# Patient Record
Sex: FEMALE | Race: WHITE | NOT HISPANIC OR LATINO | Employment: UNEMPLOYED | URBAN - METROPOLITAN AREA
[De-identification: names, ages, dates, MRNs, and addresses within clinical notes are randomized per-mention and may not be internally consistent; named-entity substitution may affect disease eponyms.]

---

## 2023-09-06 ENCOUNTER — HOSPITAL ENCOUNTER (INPATIENT)
Facility: MEDICAL CENTER | Age: 28
LOS: 2 days | End: 2023-09-08
Attending: OBSTETRICS & GYNECOLOGY | Admitting: OBSTETRICS & GYNECOLOGY
Payer: OTHER GOVERNMENT

## 2023-09-06 ENCOUNTER — HOSPITAL ENCOUNTER (EMERGENCY)
Facility: MEDICAL CENTER | Age: 28
End: 2023-09-06
Attending: OBSTETRICS & GYNECOLOGY | Admitting: OBSTETRICS & GYNECOLOGY
Payer: OTHER GOVERNMENT

## 2023-09-06 VITALS
TEMPERATURE: 97.2 F | SYSTOLIC BLOOD PRESSURE: 125 MMHG | DIASTOLIC BLOOD PRESSURE: 80 MMHG | HEIGHT: 68 IN | HEART RATE: 70 BPM | OXYGEN SATURATION: 92 % | BODY MASS INDEX: 30.16 KG/M2 | RESPIRATION RATE: 16 BRPM | WEIGHT: 199 LBS

## 2023-09-06 DIAGNOSIS — Z91.89 AT RISK FOR BREASTFEEDING DIFFICULTY: ICD-10-CM

## 2023-09-06 PROCEDURE — 99285 EMERGENCY DEPT VISIT HI MDM: CPT

## 2023-09-06 PROCEDURE — 59025 FETAL NON-STRESS TEST: CPT

## 2023-09-06 PROCEDURE — 770002 HCHG ROOM/CARE - OB PRIVATE (112)

## 2023-09-06 RX ORDER — DEXTROSE, SODIUM CHLORIDE, SODIUM LACTATE, POTASSIUM CHLORIDE, AND CALCIUM CHLORIDE 5; .6; .31; .03; .02 G/100ML; G/100ML; G/100ML; G/100ML; G/100ML
INJECTION, SOLUTION INTRAVENOUS CONTINUOUS
Status: DISCONTINUED | OUTPATIENT
Start: 2023-09-07 | End: 2023-09-08 | Stop reason: HOSPADM

## 2023-09-06 RX ORDER — OXYCODONE HYDROCHLORIDE 5 MG/1
5 TABLET ORAL
Status: DISCONTINUED | OUTPATIENT
Start: 2023-09-06 | End: 2023-09-07 | Stop reason: HOSPADM

## 2023-09-06 RX ORDER — IBUPROFEN 800 MG/1
800 TABLET ORAL
Status: DISCONTINUED | OUTPATIENT
Start: 2023-09-06 | End: 2023-09-07 | Stop reason: HOSPADM

## 2023-09-06 RX ORDER — ACETAMINOPHEN 500 MG
1000 TABLET ORAL
Status: DISCONTINUED | OUTPATIENT
Start: 2023-09-06 | End: 2023-09-07 | Stop reason: HOSPADM

## 2023-09-06 RX ORDER — ALUMINA, MAGNESIA, AND SIMETHICONE 2400; 2400; 240 MG/30ML; MG/30ML; MG/30ML
30 SUSPENSION ORAL EVERY 6 HOURS PRN
Status: DISCONTINUED | OUTPATIENT
Start: 2023-09-06 | End: 2023-09-07 | Stop reason: HOSPADM

## 2023-09-06 RX ORDER — SODIUM CHLORIDE, SODIUM LACTATE, POTASSIUM CHLORIDE, CALCIUM CHLORIDE 600; 310; 30; 20 MG/100ML; MG/100ML; MG/100ML; MG/100ML
1000 INJECTION, SOLUTION INTRAVENOUS CONTINUOUS
Status: ACTIVE | OUTPATIENT
Start: 2023-09-07 | End: 2023-09-07

## 2023-09-06 RX ORDER — ONDANSETRON 2 MG/ML
4 INJECTION INTRAMUSCULAR; INTRAVENOUS EVERY 6 HOURS PRN
Status: DISCONTINUED | OUTPATIENT
Start: 2023-09-06 | End: 2023-09-07 | Stop reason: HOSPADM

## 2023-09-06 RX ORDER — LIDOCAINE HYDROCHLORIDE 10 MG/ML
20 INJECTION, SOLUTION INFILTRATION; PERINEURAL
Status: DISCONTINUED | OUTPATIENT
Start: 2023-09-06 | End: 2023-09-07 | Stop reason: HOSPADM

## 2023-09-06 RX ORDER — METHYLERGONOVINE MALEATE 0.2 MG/ML
0.2 INJECTION INTRAVENOUS
Status: DISCONTINUED | OUTPATIENT
Start: 2023-09-06 | End: 2023-09-07 | Stop reason: HOSPADM

## 2023-09-06 RX ORDER — TERBUTALINE SULFATE 1 MG/ML
0.25 INJECTION, SOLUTION SUBCUTANEOUS
Status: DISCONTINUED | OUTPATIENT
Start: 2023-09-06 | End: 2023-09-07 | Stop reason: HOSPADM

## 2023-09-06 RX ORDER — MISOPROSTOL 200 UG/1
800 TABLET ORAL
Status: DISCONTINUED | OUTPATIENT
Start: 2023-09-06 | End: 2023-09-07 | Stop reason: HOSPADM

## 2023-09-06 RX ORDER — ONDANSETRON 4 MG/1
4 TABLET, ORALLY DISINTEGRATING ORAL EVERY 6 HOURS PRN
Status: DISCONTINUED | OUTPATIENT
Start: 2023-09-06 | End: 2023-09-07 | Stop reason: HOSPADM

## 2023-09-06 RX ORDER — CITRIC ACID/SODIUM CITRATE 334-500MG
30 SOLUTION, ORAL ORAL EVERY 6 HOURS PRN
Status: DISCONTINUED | OUTPATIENT
Start: 2023-09-06 | End: 2023-09-07 | Stop reason: HOSPADM

## 2023-09-06 RX ORDER — OXYTOCIN 10 [USP'U]/ML
10 INJECTION, SOLUTION INTRAMUSCULAR; INTRAVENOUS
Status: DISCONTINUED | OUTPATIENT
Start: 2023-09-06 | End: 2023-09-07 | Stop reason: HOSPADM

## 2023-09-06 NOTE — PROGRESS NOTES
1531: Patient is a  at 38 weeks and 5 days who arrived to L&D with c/o UC that began around 1000 this morning. Patient stated they are about 4-6 minutes apart. Patient reports +FM, denies LOF and VB. Patient denies complications with pregnancy. External monitors applied, VS obtained. SVE, see flow sheet documentation.     1556: RN telephoned Dr. Leblanc and provided update regarding SVE, patient VS, and tracing. Orders received to recheck patient in one hour and call MD back to update. If patient unchanged at that time, patient can be discharged per MD.     1703: Dr. Leblanc updated via telephone on patient SVE. Orders received to discharge patient. This RN requested Dr. Fierro to come to bedside to assess patient prior to discharge. Dr. Leblanc unable to come to bedside at this time, Dr. Tate to come to bedside to assess patient.    1707: Dr. Leblanc telephoned this RN and requested we monitor patient for an additional hour and obtain serial BP. If another BP is elevated, order PIH labs per Dr. Leblanc.     1824: RN reached out to Dr. Tate via telephone to request MD to bedside to evaluate patient prior to patient discharge. Dr. Tate to come to bedside to evaluate patient.     1845: Dr. Tate at bedside to assess patient.     1853: SVE by Dr. Tate. See flow sheet documentation. Orders received to discharge patient.     1901: Discharge instructions given to patient. Patient verbalized understanding and denies questions. Patient ambulated off unit in stable condition.

## 2023-09-07 ENCOUNTER — ANESTHESIA (OUTPATIENT)
Dept: ANESTHESIOLOGY | Facility: MEDICAL CENTER | Age: 28
End: 2023-09-07
Payer: OTHER GOVERNMENT

## 2023-09-07 ENCOUNTER — ANESTHESIA EVENT (OUTPATIENT)
Dept: ANESTHESIOLOGY | Facility: MEDICAL CENTER | Age: 28
End: 2023-09-07
Payer: OTHER GOVERNMENT

## 2023-09-07 LAB
BASOPHILS # BLD AUTO: 0.2 % (ref 0–1.8)
BASOPHILS # BLD: 0.04 K/UL (ref 0–0.12)
EOSINOPHIL # BLD AUTO: 0.12 K/UL (ref 0–0.51)
EOSINOPHIL NFR BLD: 0.6 % (ref 0–6.9)
ERYTHROCYTE [DISTWIDTH] IN BLOOD BY AUTOMATED COUNT: 42.3 FL (ref 35.9–50)
HCT VFR BLD AUTO: 40 % (ref 37–47)
HGB BLD-MCNC: 14.6 G/DL (ref 12–16)
HOLDING TUBE BB 8507: NORMAL
IMM GRANULOCYTES # BLD AUTO: 0.18 K/UL (ref 0–0.11)
IMM GRANULOCYTES NFR BLD AUTO: 0.9 % (ref 0–0.9)
LYMPHOCYTES # BLD AUTO: 1.97 K/UL (ref 1–4.8)
LYMPHOCYTES NFR BLD: 9.8 % (ref 22–41)
MCH RBC QN AUTO: 31.5 PG (ref 27–33)
MCHC RBC AUTO-ENTMCNC: 36.5 G/DL (ref 32.2–35.5)
MCV RBC AUTO: 86.4 FL (ref 81.4–97.8)
MONOCYTES # BLD AUTO: 1.17 K/UL (ref 0–0.85)
MONOCYTES NFR BLD AUTO: 5.8 % (ref 0–13.4)
NEUTROPHILS # BLD AUTO: 16.6 K/UL (ref 1.82–7.42)
NEUTROPHILS NFR BLD: 82.7 % (ref 44–72)
NRBC # BLD AUTO: 0 K/UL
NRBC BLD-RTO: 0 /100 WBC (ref 0–0.2)
PLATELET # BLD AUTO: 229 K/UL (ref 164–446)
PMV BLD AUTO: 10 FL (ref 9–12.9)
RBC # BLD AUTO: 4.63 M/UL (ref 4.2–5.4)
T PALLIDUM AB SER QL IA: NORMAL
WBC # BLD AUTO: 20.1 K/UL (ref 4.8–10.8)

## 2023-09-07 PROCEDURE — 700101 HCHG RX REV CODE 250: Performed by: ANESTHESIOLOGY

## 2023-09-07 PROCEDURE — 0UQMXZZ REPAIR VULVA, EXTERNAL APPROACH: ICD-10-PCS | Performed by: OBSTETRICS & GYNECOLOGY

## 2023-09-07 PROCEDURE — 700101 HCHG RX REV CODE 250: Performed by: OBSTETRICS & GYNECOLOGY

## 2023-09-07 PROCEDURE — 304965 HCHG RECOVERY SERVICES

## 2023-09-07 PROCEDURE — 700102 HCHG RX REV CODE 250 W/ 637 OVERRIDE(OP): Performed by: OBSTETRICS & GYNECOLOGY

## 2023-09-07 PROCEDURE — 59409 OBSTETRICAL CARE: CPT

## 2023-09-07 PROCEDURE — 700111 HCHG RX REV CODE 636 W/ 250 OVERRIDE (IP): Mod: JZ | Performed by: ANESTHESIOLOGY

## 2023-09-07 PROCEDURE — 700111 HCHG RX REV CODE 636 W/ 250 OVERRIDE (IP): Mod: JZ

## 2023-09-07 PROCEDURE — 86780 TREPONEMA PALLIDUM: CPT

## 2023-09-07 PROCEDURE — 10907ZC DRAINAGE OF AMNIOTIC FLUID, THERAPEUTIC FROM PRODUCTS OF CONCEPTION, VIA NATURAL OR ARTIFICIAL OPENING: ICD-10-PCS | Performed by: OBSTETRICS & GYNECOLOGY

## 2023-09-07 PROCEDURE — 36415 COLL VENOUS BLD VENIPUNCTURE: CPT

## 2023-09-07 PROCEDURE — 700105 HCHG RX REV CODE 258: Performed by: OBSTETRICS & GYNECOLOGY

## 2023-09-07 PROCEDURE — 85025 COMPLETE CBC W/AUTO DIFF WBC: CPT

## 2023-09-07 PROCEDURE — 0HQ9XZZ REPAIR PERINEUM SKIN, EXTERNAL APPROACH: ICD-10-PCS | Performed by: OBSTETRICS & GYNECOLOGY

## 2023-09-07 PROCEDURE — A9270 NON-COVERED ITEM OR SERVICE: HCPCS | Performed by: OBSTETRICS & GYNECOLOGY

## 2023-09-07 PROCEDURE — 303615 HCHG EPIDURAL/SPINAL ANESTHESIA FOR LABOR

## 2023-09-07 PROCEDURE — 700105 HCHG RX REV CODE 258: Performed by: ANESTHESIOLOGY

## 2023-09-07 PROCEDURE — 770002 HCHG ROOM/CARE - OB PRIVATE (112)

## 2023-09-07 PROCEDURE — 700111 HCHG RX REV CODE 636 W/ 250 OVERRIDE (IP): Mod: JZ | Performed by: OBSTETRICS & GYNECOLOGY

## 2023-09-07 RX ORDER — SODIUM CHLORIDE, SODIUM LACTATE, POTASSIUM CHLORIDE, CALCIUM CHLORIDE 600; 310; 30; 20 MG/100ML; MG/100ML; MG/100ML; MG/100ML
INJECTION, SOLUTION INTRAVENOUS PRN
Status: DISCONTINUED | OUTPATIENT
Start: 2023-09-07 | End: 2023-09-08 | Stop reason: HOSPADM

## 2023-09-07 RX ORDER — ROPIVACAINE HYDROCHLORIDE 2 MG/ML
INJECTION, SOLUTION EPIDURAL; INFILTRATION; PERINEURAL
Status: COMPLETED
Start: 2023-09-07 | End: 2023-09-07

## 2023-09-07 RX ORDER — SODIUM CHLORIDE, SODIUM LACTATE, POTASSIUM CHLORIDE, AND CALCIUM CHLORIDE .6; .31; .03; .02 G/100ML; G/100ML; G/100ML; G/100ML
250 INJECTION, SOLUTION INTRAVENOUS PRN
Status: DISCONTINUED | OUTPATIENT
Start: 2023-09-07 | End: 2023-09-07 | Stop reason: HOSPADM

## 2023-09-07 RX ORDER — BUPIVACAINE HYDROCHLORIDE 2.5 MG/ML
INJECTION, SOLUTION EPIDURAL; INFILTRATION; INTRACAUDAL
Status: COMPLETED
Start: 2023-09-07 | End: 2023-09-07

## 2023-09-07 RX ORDER — LEVOTHYROXINE SODIUM 0.05 MG/1
50 TABLET ORAL
Status: DISCONTINUED | OUTPATIENT
Start: 2023-09-07 | End: 2023-09-07 | Stop reason: HOSPADM

## 2023-09-07 RX ORDER — SIMETHICONE 125 MG
125 TABLET,CHEWABLE ORAL 4 TIMES DAILY PRN
Status: DISCONTINUED | OUTPATIENT
Start: 2023-09-07 | End: 2023-09-08 | Stop reason: HOSPADM

## 2023-09-07 RX ORDER — EPHEDRINE SULFATE 50 MG/ML
5 INJECTION, SOLUTION INTRAVENOUS
Status: DISCONTINUED | OUTPATIENT
Start: 2023-09-07 | End: 2023-09-07 | Stop reason: HOSPADM

## 2023-09-07 RX ORDER — ACETAMINOPHEN 500 MG
1000 TABLET ORAL EVERY 6 HOURS PRN
Status: DISCONTINUED | OUTPATIENT
Start: 2023-09-07 | End: 2023-09-08 | Stop reason: HOSPADM

## 2023-09-07 RX ORDER — BUPIVACAINE HYDROCHLORIDE 2.5 MG/ML
INJECTION, SOLUTION EPIDURAL; INFILTRATION; INTRACAUDAL PRN
Status: DISCONTINUED | OUTPATIENT
Start: 2023-09-07 | End: 2023-09-07 | Stop reason: SURG

## 2023-09-07 RX ORDER — ROPIVACAINE HYDROCHLORIDE 2 MG/ML
INJECTION, SOLUTION EPIDURAL; INFILTRATION; PERINEURAL CONTINUOUS
Status: DISCONTINUED | OUTPATIENT
Start: 2023-09-07 | End: 2023-09-08 | Stop reason: HOSPADM

## 2023-09-07 RX ORDER — IBUPROFEN 800 MG/1
800 TABLET ORAL EVERY 8 HOURS PRN
Status: DISCONTINUED | OUTPATIENT
Start: 2023-09-07 | End: 2023-09-08 | Stop reason: HOSPADM

## 2023-09-07 RX ORDER — BISACODYL 10 MG
10 SUPPOSITORY, RECTAL RECTAL PRN
Status: DISCONTINUED | OUTPATIENT
Start: 2023-09-07 | End: 2023-09-08 | Stop reason: HOSPADM

## 2023-09-07 RX ORDER — VITAMIN A ACETATE, BETA CAROTENE, ASCORBIC ACID, CHOLECALCIFEROL, .ALPHA.-TOCOPHEROL ACETATE, DL-, THIAMINE MONONITRATE, RIBOFLAVIN, NIACINAMIDE, PYRIDOXINE HYDROCHLORIDE, FOLIC ACID, CYANOCOBALAMIN, CALCIUM CARBONATE, FERROUS FUMARATE, ZINC OXIDE, CUPRIC OXIDE 3080; 12; 120; 400; 1; 1.84; 3; 20; 22; 920; 25; 200; 27; 10; 2 [IU]/1; UG/1; MG/1; [IU]/1; MG/1; MG/1; MG/1; MG/1; MG/1; [IU]/1; MG/1; MG/1; MG/1; MG/1; MG/1
1 TABLET, FILM COATED ORAL
Status: DISCONTINUED | OUTPATIENT
Start: 2023-09-07 | End: 2023-09-08 | Stop reason: HOSPADM

## 2023-09-07 RX ORDER — METHYLERGONOVINE MALEATE 0.2 MG/ML
0.2 INJECTION INTRAVENOUS
Status: DISCONTINUED | OUTPATIENT
Start: 2023-09-07 | End: 2023-09-08 | Stop reason: HOSPADM

## 2023-09-07 RX ORDER — LEVOTHYROXINE SODIUM 0.05 MG/1
50 TABLET ORAL
COMMUNITY

## 2023-09-07 RX ORDER — LIDOCAINE HYDROCHLORIDE AND EPINEPHRINE 15; 5 MG/ML; UG/ML
INJECTION, SOLUTION EPIDURAL PRN
Status: DISCONTINUED | OUTPATIENT
Start: 2023-09-07 | End: 2023-09-07 | Stop reason: SURG

## 2023-09-07 RX ORDER — SODIUM CHLORIDE, SODIUM LACTATE, POTASSIUM CHLORIDE, AND CALCIUM CHLORIDE .6; .31; .03; .02 G/100ML; G/100ML; G/100ML; G/100ML
1000 INJECTION, SOLUTION INTRAVENOUS
Status: COMPLETED | OUTPATIENT
Start: 2023-09-07 | End: 2023-09-07

## 2023-09-07 RX ORDER — DOCUSATE SODIUM 100 MG/1
100 CAPSULE, LIQUID FILLED ORAL 2 TIMES DAILY PRN
Status: DISCONTINUED | OUTPATIENT
Start: 2023-09-07 | End: 2023-09-08 | Stop reason: HOSPADM

## 2023-09-07 RX ORDER — CARBOPROST TROMETHAMINE 250 UG/ML
250 INJECTION, SOLUTION INTRAMUSCULAR
Status: DISCONTINUED | OUTPATIENT
Start: 2023-09-07 | End: 2023-09-08 | Stop reason: HOSPADM

## 2023-09-07 RX ORDER — CALCIUM CARBONATE 500 MG/1
1000 TABLET, CHEWABLE ORAL EVERY 6 HOURS PRN
Status: DISCONTINUED | OUTPATIENT
Start: 2023-09-07 | End: 2023-09-08 | Stop reason: HOSPADM

## 2023-09-07 RX ORDER — MISOPROSTOL 200 UG/1
600 TABLET ORAL
Status: DISCONTINUED | OUTPATIENT
Start: 2023-09-07 | End: 2023-09-08 | Stop reason: HOSPADM

## 2023-09-07 RX ORDER — OXYCODONE HYDROCHLORIDE 5 MG/1
5 TABLET ORAL EVERY 4 HOURS PRN
Status: DISCONTINUED | OUTPATIENT
Start: 2023-09-07 | End: 2023-09-08 | Stop reason: HOSPADM

## 2023-09-07 RX ADMIN — FENTANYL CITRATE 100 MCG: 50 INJECTION, SOLUTION INTRAMUSCULAR; INTRAVENOUS at 01:49

## 2023-09-07 RX ADMIN — SODIUM CHLORIDE, POTASSIUM CHLORIDE, SODIUM LACTATE AND CALCIUM CHLORIDE 1000 ML: 600; 310; 30; 20 INJECTION, SOLUTION INTRAVENOUS at 01:00

## 2023-09-07 RX ADMIN — SODIUM CHLORIDE 2.5 MILLION UNITS: 9 INJECTION, SOLUTION INTRAVENOUS at 06:17

## 2023-09-07 RX ADMIN — OXYTOCIN 2 MILLI-UNITS/MIN: 10 INJECTION, SOLUTION INTRAMUSCULAR; INTRAVENOUS at 07:35

## 2023-09-07 RX ADMIN — SODIUM CHLORIDE 2.5 MILLION UNITS: 9 INJECTION, SOLUTION INTRAVENOUS at 09:57

## 2023-09-07 RX ADMIN — LIDOCAINE HYDROCHLORIDE,EPINEPHRINE BITARTRATE 3 ML: 15; .005 INJECTION, SOLUTION EPIDURAL; INFILTRATION; INTRACAUDAL; PERINEURAL at 01:39

## 2023-09-07 RX ADMIN — OXYTOCIN 20 UNITS: 10 INJECTION, SOLUTION INTRAMUSCULAR; INTRAVENOUS at 11:48

## 2023-09-07 RX ADMIN — ROPIVACAINE HYDROCHLORIDE 200 MG: 2 INJECTION, SOLUTION EPIDURAL; INFILTRATION at 01:43

## 2023-09-07 RX ADMIN — SODIUM CHLORIDE 5 MILLION UNITS: 900 INJECTION INTRAVENOUS at 00:49

## 2023-09-07 RX ADMIN — SODIUM CHLORIDE, POTASSIUM CHLORIDE, SODIUM LACTATE AND CALCIUM CHLORIDE 1000 ML: 600; 310; 30; 20 INJECTION, SOLUTION INTRAVENOUS at 00:45

## 2023-09-07 RX ADMIN — SODIUM CHLORIDE, SODIUM LACTATE, POTASSIUM CHLORIDE, CALCIUM CHLORIDE AND DEXTROSE MONOHYDRATE: 5; 600; 310; 30; 20 INJECTION, SOLUTION INTRAVENOUS at 08:19

## 2023-09-07 RX ADMIN — OXYTOCIN 125 ML/HR: 10 INJECTION, SOLUTION INTRAMUSCULAR; INTRAVENOUS at 13:12

## 2023-09-07 RX ADMIN — BUPIVACAINE HYDROCHLORIDE 5 ML: 2.5 INJECTION, SOLUTION EPIDURAL; INFILTRATION; INTRACAUDAL at 01:49

## 2023-09-07 RX ADMIN — ACETAMINOPHEN 1000 MG: 500 TABLET ORAL at 20:10

## 2023-09-07 RX ADMIN — ROPIVACAINE HYDROCHLORIDE: 2 INJECTION, SOLUTION EPIDURAL; INFILTRATION at 10:00

## 2023-09-07 RX ADMIN — LEVOTHYROXINE SODIUM 50 MCG: 0.05 TABLET ORAL at 06:13

## 2023-09-07 ASSESSMENT — PAIN SCALES - GENERAL: PAIN_LEVEL: 0

## 2023-09-07 ASSESSMENT — PATIENT HEALTH QUESTIONNAIRE - PHQ9
1. LITTLE INTEREST OR PLEASURE IN DOING THINGS: NOT AT ALL
2. FEELING DOWN, DEPRESSED, IRRITABLE, OR HOPELESS: NOT AT ALL
SUM OF ALL RESPONSES TO PHQ9 QUESTIONS 1 AND 2: 0

## 2023-09-07 ASSESSMENT — PAIN DESCRIPTION - PAIN TYPE: TYPE: ACUTE PAIN

## 2023-09-07 ASSESSMENT — LIFESTYLE VARIABLES: ALCOHOL_USE: NO

## 2023-09-07 NOTE — PROGRESS NOTES
Pt received to room 331. Report received from L&D RN. Pt oriented to room, call light, infant security, emergency light, visiting hours and unit routine. Plan of care discussed encouraged to call with needs.    No

## 2023-09-07 NOTE — ANESTHESIA TIME REPORT
Anesthesia Start and Stop Event Times     Date Time Event    9/7/2023 0127 Ready for Procedure     0127 Anesthesia Start     1147 Anesthesia Stop        Responsible Staff  09/07/23    Name Role Begin End    Salvador Womack M.D. Anesth 0127 0700    Azalea Bacon M.D. Anesth 0700 1147        Overtime Reason:  no overtime (within assigned shift)    Comments:

## 2023-09-07 NOTE — ED PROVIDER NOTES
DATE OF SERVICE:  2023     OBSTETRIC EMERGENCY DEPARTMENT CONSULTATION     CHIEF COMPLAINT:  Contractions.     HISTORY OF PRESENT ILLNESS:  This 28-year-old lady is  1.  Her   gestational age is 38 and 5/7 weeks.  She recently moved here from [place].    She came complaining of regular painful contractions.  She has been monitored   in the triage unit for several hours now.  Her cervix has been checked   multiple times, most recently by me about 5 minutes ago.  Her cervix is not   changing, it is 1 cm dilated, 70% effaced with the baby's head at -2 station.    Membranes are intact.  Fetal monitoring is reassuring.     PHYSICAL EXAMINATION:  VITAL SIGNS:  She is afebrile, temperature is 97.2, blood pressure 125/80.  ABDOMEN:  Her uterus is soft and nontender.     ASSESSMENT AND PLAN:  She is clearly not in active labor.  This could be early   labor, but admission is not warranted at this time.  She will be staying in   [facility] and keeping her appointment with Dr. Banerjee at 0900 tomorrow.    We gave her instructions on when to return if she has more convincing signs   of active labor.        ______________________________  MD LANDEN Roger/IVETTE    DD:  2023 19:01  DT:  2023 20:37    Job#:  [id number]    CC:FLIP BANERJEE MD

## 2023-09-07 NOTE — ANESTHESIA PREPROCEDURE EVALUATION
Date: 09/07/23  Procedure: Labor Epidural         Relevant Problems   No relevant active problems       Physical Exam    Airway   Mallampati: II  TM distance: >3 FB  Neck ROM: full       Cardiovascular - normal exam  Rhythm: regular  Rate: normal  (-) murmur     Dental - normal exam           Pulmonary - normal exam  Breath sounds clear to auscultation     Abdominal    Neurological - normal exam                 Anesthesia Plan    ASA 2       Plan - epidural   Neuraxial block will be labor analgesia                  Pertinent diagnostic labs and testing reviewed    Informed Consent:    Anesthetic plan and risks discussed with patient.

## 2023-09-07 NOTE — ANESTHESIA PROCEDURE NOTES
Epidural Block    Date/Time: 9/7/2023 1:35 AM    Performed by: Salvador Womack M.D.  Authorized by: Salvador Womack M.D.    Patient Location:  OB  Start Time:  9/7/2023 1:35 AM  End Time:  9/7/2023 1:39 AM  Reason for Block: labor analgesia    patient identified, IV checked, site marked, risks and benefits discussed, surgical consent, monitors and equipment checked and pre-op evaluation    Patient Position:  Sitting  Prep: ChloraPrep, patient draped and sterile technique    Monitoring:  Blood pressure, continuous pulse oximetry and heart rate  Approach:  Midline  Location:  L3-L4  Injection Technique:  SHEREEN saline  Skin infiltration:  Lidocaine  Strength:  1%  Dose:  3ml  Needle Type:  Tuohy  Needle Gauge:  17 G  Needle Length:  3.5 in  Loss of resistance::  4.5  Catheter Size:  19 G  Catheter at Skin Depth:  9  Test Dose Result:  Negative   No injection resistance with test dose, but after epidural was taped and secured with patient laying supine in bed, unable to inject additional bolus dose. Catheter and alligator clamp were checked. No obvious obstruction or kink was identified. Still unable to inject. Tape dressing was then carefully removed with the patient sitting upright. Epidural catheter was then easy to inject. Epidural re-taped and repeat attempt to bolus was easy.

## 2023-09-07 NOTE — PROGRESS NOTES
Labor Progress Note    Yamilex Horton   38w6d      Subjective:  Pt comfortable with epidural.  Uterine contractions:yes  Pain: No    Objective:   Vitals:    23 0733 23 0744 23 0800 23 0816   BP: 101/49 113/56 107/59 109/63   Pulse: 87 70 68 69   Resp:       Temp:       TempSrc:       SpO2:       Weight:       Height:         FHT: 140's cat 1  [place]: q 3-5  SVE:4-5/90/0, arom, light mec  Membranes ruptured: .yes, light mec    Meds:   Epidural : .yes  Pitocin: .yes, 4 mu  Pencillin G per GBBS protocol  Labs:  Recent Results (from the past 24 hour(s))   Hold Blood Bank Specimen (Not Tested)    Collection Time: 23 12:17 AM   Result Value Ref Range    Holding Tube - Bb DONE    CBC with differential    Collection Time: 23 12:17 AM   Result Value Ref Range    WBC 20.1 (H) 4.8 - 10.8 K/uL    RBC 4.63 4.20 - 5.40 M/uL    Hemoglobin 14.6 12.0 - 16.0 g/dL    Hematocrit 40.0 37.0 - 47.0 %    MCV 86.4 81.4 - 97.8 fL    MCH 31.5 27.0 - 33.0 pg    MCHC 36.5 (H) 32.2 - 35.5 g/dL    RDW 42.3 35.9 - 50.0 fL    Platelet Count 229 164 - 446 K/uL    MPV 10.0 9.0 - 12.9 fL    Neutrophils-Polys 82.70 (H) 44.00 - 72.00 %    Lymphocytes 9.80 (L) 22.00 - 41.00 %    Monocytes 5.80 0.00 - 13.40 %    Eosinophils 0.60 0.00 - 6.90 %    Basophils 0.20 0.00 - 1.80 %    Immature Granulocytes 0.90 0.00 - 0.90 %    Nucleated RBC 0.00 0.00 - 0.20 /100 WBC    Neutrophils (Absolute) 16.60 (H) 1.82 - 7.42 K/uL    Lymphs (Absolute) 1.97 1.00 - 4.80 K/uL    Monos (Absolute) 1.17 (H) 0.00 - 0.85 K/uL    Eos (Absolute) 0.12 0.00 - 0.51 K/uL    Baso (Absolute) 0.04 0.00 - 0.12 K/uL    Immature Granulocytes (abs) 0.18 (H) 0.00 - 0.11 K/uL    NRBC (Absolute) 0.00 K/uL   T.PALLIDUM AB BOBBY (Syphilis)    Collection Time: 23 12:17 AM   Result Value Ref Range    Syphilis, Treponemal Qual Non-Reactive Non-Reactive       Assessment:   38w6d/active labor-progressing. CPM. Expt .  GBBS pos-s/p 2nd dose of Penicillin G  Light  [facility]  Fetal status-reassuring        Felisa Leblanc M.D.

## 2023-09-07 NOTE — CARE PLAN
The patient is Stable - Low risk of patient condition declining or worsening    Shift Goals  Clinical Goals: make adequate cervical change  Patient Goals: healthy baby and mom  Family Goals: support    Progress made toward(s) clinical / shift goals:      Problem: Knowledge Deficit - L&D  Goal: Patient and family/caregivers will demonstrate understanding of plan of care, disease process/condition, diagnostic tests and medications  Outcome: Progressing   Patient and FOB remain updated on Plan of care.   Problem: Risk for Excess Fluid Volume  Goal: Patient will demonstrate pulse, blood pressure and neurologic signs within expected ranges and without any respiratory complications  Outcome: Progressing     Problem: Psychosocial - L&D  Goal: Patient's level of anxiety will decrease  Outcome: Progressing  Goal: Patient will be able to discuss coping skills during hospitalization  Outcome: Progressing  Goal: Patient's ability to re-evaluate and adapt role responsibilities will improve  Outcome: Progressing  Goal: Spiritual and cultural needs incorporated into hospitalization  Outcome: Progressing     Problem: Risk for Venous Thromboembolism (VTE)  Goal: VTE prevention measures will be implemented and patient will remain free from VTE  Outcome: Progressing     Problem: Risk for Infection and Impaired Wound Healing  Goal: Patient will remain free from infection  Outcome: Progressing  Goal: Patient's wound/surgical incision will decrease in size and heals properly  Outcome: Progressing     Problem: Risk for Fluid Imbalance  Goal: Patient's fluid volume balance will be maintained or improve  Outcome: Progressing     Problem: Risk for Injury  Goal: Patient and fetus will be free of preventable injury/complications  Outcome: Progressing     Problem: Pain  Goal: Patient's pain will be alleviated or reduced to the patient’s comfort goal  Outcome: Progressing   Patient remained comfortable with epidural during labor  Problem:  Discharge Barriers/Planning  Goal: Patient's continuum of care needs are met  Outcome: Progressing

## 2023-09-07 NOTE — PROGRESS NOTES
0100: Report received from Jaqueline LAGUERRE. Plan of care discussed, care assumed at this time. This RN prepping patient for epidural.     0126: Cirac MD at bedside for epidural placement, time out performed, all agree.     0530: SVE 3-4/80/-2. Bebeto HERNANDEZ notified of patient status. MD to come to bedside to discuss augmentation options with patient.     0620: Bebeto HERNANDEZ to bedside. New orders received, see MAR.

## 2023-09-07 NOTE — PROGRESS NOTES
EDC 9/15/2023 38w5d    Patient presents to L&D triage unit reporting regular uterine contractions that began at 10am today that have gotten stronger and are now 3-5 minutes apart. Patient denies LOF/VB; states normal FM. EFM monitors applied and tracing. VSS. Mother at bedside. Call light at bedside, patient instructed on use. Questions answered at this time.     2345: SVE 3/80/-2. Dr. Tate updated, orders received.     0000: Dr. Tate to bedside.     Patient ambulated to S 214.    0100: Report to Arlene LAGUERRE. Care relinquished.

## 2023-09-07 NOTE — CARE PLAN
Problem: Risk for Injury  Goal: Patient and fetus will be free of preventable injury/complications  Outcome: Progressing  Note: Continuous fetal heart rate and uterine contraction monitoring in place.      Problem: Pain  Goal: Patient's pain will be alleviated or reduced to the patient’s comfort goal  Outcome: Progressing  Note: Patient educated on epidural; patient requesting one at this time. This RN prepping patient for epidural placement.      The patient is Stable - Low risk of patient condition declining or worsening    Shift Goals  Clinical Goals: pain management; cervical dilation  Patient Goals: pain control  Family Goals: support    Progress made toward(s) clinical / shift goals:  Progressing

## 2023-09-07 NOTE — ANESTHESIA POSTPROCEDURE EVALUATION
Patient: Yamilex Horton    Procedure Summary     Date: 09/07/23 Room / Location:     Anesthesia Start: 0127 Anesthesia Stop: 1147    Procedure: Labor Epidural Diagnosis:     Scheduled Providers:  Responsible Provider: Azalea Bacon M.D.    Anesthesia Type: epidural ASA Status: 2          Final Anesthesia Type: epidural  Last vitals  BP   Blood Pressure: 114/67    Temp   36.2 °C (97.1 °F)    Pulse   77   Resp   (!) 22    SpO2   95 %      Anesthesia Post Evaluation    Patient location during evaluation: bedside  Patient participation: complete - patient participated  Level of consciousness: awake and alert  Pain score: 0    Airway patency: patent  Anesthetic complications: no  Cardiovascular status: hemodynamically stable  Respiratory status: acceptable  Hydration status: euvolemic    PONV: none          No notable events documented.              [facility] Daily Progress Note    Patient: Magdalene Shields Date: 2022   : 1957 Attending: Tyron Childers [facility] Cornell Farrar.

## 2023-09-07 NOTE — PROGRESS NOTES
(Awaiting H and P transcription from 5.5 hrs ago)    Epidural placed.  S/p 2nd dose IV PenG GBS proph.  FHR Cat I  Ctx have spaced out to Q 5 minutes.  Cx still 3/80%/-2 per RN (unchanged)    Disc options for augmentation (AROM vs pitocin).    PLAN: Pitocin augmentation.

## 2023-09-07 NOTE — H&P
DATE OF ADMISSION:  2023     CHIEF COMPLAINT:  Contractions.     HISTORY OF PRESENT ILLNESS:  The patient is a 28-year-old lady,  1, her   JHOANA is 09/15/2023, so her EGA is 38 and 6/7th weeks.  She is here with   regular painful contractions and cervical change.  She is reji every   2.5 minutes.  Her cervix is 3 cm dilated, 80% effaced with the baby's head at   -2 station.  Membranes were intact.  Her cervix has changed markedly from when   she was here 6 hours ago, sent home undelivered.  Fetal monitoring is   reassuring.  She is group B strep positive.  Preparations are being made for   Admission, epidural anesthesia and intravenous penicillin G for group B strep   prophylaxis.     Prenatal care started in [place] and transferred here 2 weeks ago.  Her blood   type is O positive.  Serologies were negative.  She reports that her group B   strep culture was positive in [place].  We do not have that report.  She said   she had a negative Cell-free DNA screening.  She has been on hypothyroidism   and reports normal TSH levels.  All of her blood pressures have been normal.     OBSTETRICAL HISTORY:  G1.     PAST MEDICAL HISTORY:  Positive for hypothyroidism.     PAST SURGICAL HISTORY: Left MCL reconstruction in .     MEDICATIONS:   1.  Prenatal vitamin daily.  2.  Levothyroxine 50 mcg daily.     FAMILY HISTORY:  Unremarkable.     PHYSICAL EXAMINATION:    VITAL SIGNS:  Temperature 98.0, pulse 75, respirations 22 and /71.  HEENT:  Normal.  LUNGS:  Clear to auscultation.  HEART:  Sounds normal.  ABDOMEN:  Nontender.  Fundal height is appropriate.  No hepatosplenomegaly.  EXTREMITIES:  1+ edema.  Homans' negative.  NEUROLOGIC:  DTRs normal.  PELVIC:  Per RN, cervix is 2 cm dilated, 50% effaced with the baby's head at   -2 station.     DIAGNOSES:   1.  A 38 and 6/7th weeks' gestation.  2.  Labor.  3.  Group B strep positive.     PLAN:  Admit, pursue delivery.  I have ordered intravenous  penicillin G for   group B strep prophylaxis.        ______________________________  MD LANDEN Roger/JOS    DD:  09/07/2023 00:20  DT:  09/07/2023 00:35    Job#:  [id number]    CC:FLIP BANERJEE MD

## 2023-09-07 NOTE — PROGRESS NOTES
0700: Report received from Arlene MARSH RN. POC discussed. FOB Gildardo at bedside. Patient denies concerns or needs at this time. Call light in reach.     1136: Dr. Leblanc, RT, and transition nurse at bedside with this RN to attend delivery. Patient in stirrups and began pushing.    1147: Spontaneous vaginal delivery of viable male infant. See provider delivery note for details.      1505: patient and infant transferred up to postpartum room S331 in stable condition via wheelchair by RN, accompanied by FOB with all belongings in hand. Report given to CARLOTTA Dwyer. POC discussed. Baby bands verified and correct. Care relinquished.

## 2023-09-07 NOTE — L&D DELIVERY NOTE
Delivery note  , male with loose nuchal cord x 1 and light mec. RT at bedside. Head delivered atraumatically, loose nuchal reduced and rest of infant delivered without problems. Mouth and nose bulb suctioned. Apgars 8 and 9. Infant placed on maternal abdomen and cord doubly clamped and cut. Placenta intact, 3 vc. 1st degree vaginal laceration repaired with 2-0 vicryl on ct-1 needle. Left labial abrasion repaired with same 2-0 vicryl on ct-1 needle. Uterus is firm. EBL: 300. Mom and baby doing well. Pt had 3 doses of Penicillin G.

## 2023-09-08 VITALS
DIASTOLIC BLOOD PRESSURE: 89 MMHG | OXYGEN SATURATION: 94 % | SYSTOLIC BLOOD PRESSURE: 116 MMHG | HEART RATE: 60 BPM | WEIGHT: 199 LBS | HEIGHT: 68 IN | RESPIRATION RATE: 16 BRPM | TEMPERATURE: 96.9 F | BODY MASS INDEX: 30.16 KG/M2

## 2023-09-08 LAB
ERYTHROCYTE [DISTWIDTH] IN BLOOD BY AUTOMATED COUNT: 45.7 FL (ref 35.9–50)
HCT VFR BLD AUTO: 36.5 % (ref 37–47)
HGB BLD-MCNC: 12.6 G/DL (ref 12–16)
MCH RBC QN AUTO: 31 PG (ref 27–33)
MCHC RBC AUTO-ENTMCNC: 34.5 G/DL (ref 32.2–35.5)
MCV RBC AUTO: 89.7 FL (ref 81.4–97.8)
PLATELET # BLD AUTO: 194 K/UL (ref 164–446)
PMV BLD AUTO: 10 FL (ref 9–12.9)
RBC # BLD AUTO: 4.07 M/UL (ref 4.2–5.4)
WBC # BLD AUTO: 17.2 K/UL (ref 4.8–10.8)

## 2023-09-08 PROCEDURE — 85027 COMPLETE CBC AUTOMATED: CPT

## 2023-09-08 PROCEDURE — 36415 COLL VENOUS BLD VENIPUNCTURE: CPT

## 2023-09-08 PROCEDURE — 700102 HCHG RX REV CODE 250 W/ 637 OVERRIDE(OP): Performed by: OBSTETRICS & GYNECOLOGY

## 2023-09-08 PROCEDURE — A9270 NON-COVERED ITEM OR SERVICE: HCPCS | Performed by: OBSTETRICS & GYNECOLOGY

## 2023-09-08 RX ADMIN — ACETAMINOPHEN 1000 MG: 500 TABLET ORAL at 01:42

## 2023-09-08 RX ADMIN — PRENATAL WITH FERROUS FUM AND FOLIC ACID 1 TABLET: 3080; 920; 120; 400; 22; 1.84; 3; 20; 10; 1; 12; 200; 27; 25; 2 TABLET ORAL at 10:04

## 2023-09-08 ASSESSMENT — EDINBURGH POSTNATAL DEPRESSION SCALE (EPDS)
I HAVE BLAMED MYSELF UNNECESSARILY WHEN THINGS WENT WRONG: YES, SOME OF THE TIME
I HAVE BEEN ABLE TO LAUGH AND SEE THE FUNNY SIDE OF THINGS: AS MUCH AS I ALWAYS COULD
I HAVE BEEN ANXIOUS OR WORRIED FOR NO GOOD REASON: YES, SOMETIMES
I HAVE FELT SAD OR MISERABLE: NO, NOT AT ALL
I HAVE LOOKED FORWARD WITH ENJOYMENT TO THINGS: AS MUCH AS I EVER DID
THINGS HAVE BEEN GETTING ON TOP OF ME: NO, MOST OF THE TIME I HAVE COPED QUITE WELL
I HAVE BEEN SO UNHAPPY THAT I HAVE BEEN CRYING: NO, NEVER
THE THOUGHT OF HARMING MYSELF HAS OCCURRED TO ME: NEVER
I HAVE BEEN SO UNHAPPY THAT I HAVE HAD DIFFICULTY SLEEPING: NOT AT ALL
I HAVE FELT SCARED OR PANICKY FOR NO GOOD REASON: NO, NOT MUCH

## 2023-09-08 ASSESSMENT — PAIN DESCRIPTION - PAIN TYPE
TYPE: ACUTE PAIN
TYPE: ACUTE PAIN

## 2023-09-08 NOTE — LACTATION NOTE
Initial Lactation Consultation:    Met with Yamilex and her new baby boy Med.  She reports breastfeeding has been somewhat challenging. Med will latch, but then becomes fussy and pulls off.    Infant presents with feeding cues at this time and is placed skin-to-skin with his mother. Hand expression demonstrated with easily expressible colostrum. Lactation consultant attempted to assist with latch onto left breast, using cross cradle hold. Infant fussy and unable to achieve latch. Repositioned into side-lying hold. Initially infant continued fussing at breast, but after several attempts, latch sustained. Infant visualized to have rhythmic suck pattern at breast. Swallows appreciated Mother of baby denies pain with latch. Infant able to sustain latch for several minutes prior to pulling off and re-latching.     feeding and voiding/stooling patterns reviewed. Frequent skin-to-skin and cue-based feeding is encouraged; at least 8 feeds per 24 hours. Reviewed the milk making process, inclusive of supply and demand. Discussed signs of deep, asymmetric latch, and the importance of maintaining good latch to avoid pain/nipple damage and maximize milk transfer. Yamilex is to offer both breasts at each feeding, and baby should be allowed to self-limit time at breast. Discouraged introduction of artificial nipples during first 4 weeks, unless medically indicated.    Feeding plan:     Continue with cue-based breast feeding at least once every three days, for a total of 8+ feedings per 24 hours.     Yamilex is provided with the opportunity to ask questions. These have been answered to her satisfaction. She is encouraged to call RN/lactation for additional breastfeeding assistance, as needed, throughout remainder of hospital stay.       Encouraged follow up with [facility] for outpatient lactation support (referral sent).     [facility] Breastfeeding Resource list provided.

## 2023-09-08 NOTE — PROGRESS NOTES
Assessment complete. Patient resting comfortably in bed at this time. Fundus firm, lochia light. Pain controlled with PRN meds per MAR. Patient states voiding without difficulty. Plan of care discussed. Call light within patient reach, patient to call if assistance needed.

## 2023-09-08 NOTE — CARE PLAN
The patient is Stable - Low risk of patient condition declining or worsening    Shift Goals  Clinical Goals: VSS, pain WDL  Patient Goals: healthy baby and mom  Family Goals: support    Progress made toward(s) clinical / shift goals:          Problem: Knowledge Deficit - Postpartum  Goal: Patient will verbalize and demonstrate understanding of self and infant care  Outcome: Progressing  Parents educated and all of their questions were answered.     Problem: Early Mobilization - Post Surgery  Goal: Early mobilization post surgery  Outcome: Progressing  Patient was able to mobilize without any issues. She denies pain or dizziness when mobilizing.

## 2023-09-08 NOTE — PROGRESS NOTES
Received report from [facility] nurse and assumed care for patient.     0009 Assessed patient, within WDL and call light is within reach. Discussed plan of care with patient and answered all of her questions

## 2023-09-08 NOTE — DISCHARGE INSTRUCTIONS

## 2023-09-08 NOTE — CARE PLAN
The patient is Stable - Low risk of patient condition declining or worsening    Shift Goals  Clinical Goals: Rest, pain WDL  Patient Goals:   Family Goals:     Progress made toward(s) clinical / shift goals:  Patient able to rest in between feeds, pain controlled with PRN medications and rest    Patient is not progressing towards the following goals:

## 2023-09-08 NOTE — PROGRESS NOTES
"PPD #1    S:  Doing well.  Working on breast feeding but baby is not latching well so she will stay until tomorrow.  She states her pain is well controlled with oral pain medications. She has moderate lochia.  She is ambulating, voiding spontaneously, and tolerating a regular diet.    O:  /89   Pulse 60   Temp 36.1 °C (96.9 °F) (Temporal)   Resp 16   Ht 1.727 m (5' 8\")   Wt 90.3 kg (199 lb)   SpO2 94%     A, A, and O x 3 NAD    FF u/1    No c/c/e    Recent Labs     23  0017   WBC 20.1*   RBC 4.63   HEMOGLOBIN 14.6   HEMATOCRIT 40.0   MCV 86.4   MCH 31.5   RDW 42.3   PLATELETCT 229   MPV 10.0   NEUTSPOLYS 82.70*   LYMPHOCYTES 9.80*   MONOCYTES 5.80   EOSINOPHILS 0.60   BASOPHILS 0.20     CBC pending today.    A/P: PPD #1 s/p  with repair of first degree vaginal laceration and left labial abrasion.  GBS positive.  S/P 3 doses or Penicillin G.     Ambulate TID.  ADAT.  Work on breast feeding.  Anticipate D/C tomorrow morning.  "

## 2023-09-08 NOTE — DISCHARGE PLANNING
Discharge Planning Assessment Post Partum    Reason for Referral: History of anxiety  Address: [address] [place] [address] [place] [address]  Phone: [phone]  Type of Living Situation: living with FOB  Mom Diagnosis: Pregnancy  Baby Diagnosis: -38.6 weeks  Primary Language: English    Name of Baby: Med Horton (: 23)  Father of the Baby: Gildardo Horton   Involved in baby’s care? Yes  Contact Information: [phone]    Prenatal Care: Yes, received care in [place] and transferred care here two weeks ago   Mom's PCP: No PCP listed   PCP for new baby: Dr. Phillips    Support System: FOB and family  Coping/Bonding between mother & baby: Yes  Source of Feeding: breast feeding  Supplies for Infant: prepared for infant; denies any needs    Mom's Insurance:    Baby Covered on Insurance:Yes  Other children in the home/names & ages: first baby    Financial Hardship/Income: No, FOB is employed with the [employer]    Mom's Mental status: alert and oriented  Services used prior to admit: None    CPS History: No  Psychiatric History: history of anxiety.  Discussed with parents and offered resources.  MOB states she has been doing well and keeping very busy with their move here.  MOB denies the need for resources.  Domestic Violence History: No  Drug/ETOH History: No    Resources Provided: Offered resources, however parents are well-prepared for infant and deny any needs  Referrals Made: None     Clearance for Discharge: Infant is cleared to discharge home with parents once medically cleared

## 2023-09-08 NOTE — L&D DELIVERY NOTE
DATE OF SERVICE:  2023     ADMITTING DIAGNOSES:    1.  Intrauterine pregnancy at 38 weeks and 6 days.  2.  Active labor.  3.  Group B strep positive.  4.  Light meconium.     PROCEDURES:    1.  Admission to labor and delivery.  2.  Epidural.  3.  Pitocin up to 4 milliunits.  4.  Penicillin G per protocol, 3 doses.  5.  Normal spontaneous vaginal delivery of a vigorous male with Apgars 8 and   9.       DELIVERY:  This patient is a 28-year-old  1, para 0 with a due date of   09/15/2023 who is 38 weeks and 6 days.  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  She has had 2 prenatal visits   with me.  She did have an uneventful prenatal care in [place].  She was GBS   positive, came in yesterday complaining of contractions, was 1 cm dilated   after about 3 hours and was sent home.  She came back in the middle of the   night complaining of regular painful contractions and was found to be 3-4 cm   dilated and therefore, in early labor and admitted.  She was started on   penicillin G per GBS prophylaxis.  She was afebrile with a reassuring fetal   heart tracing.  After receiving 2 doses of penicillin G, she had artificial   rupture of membranes this morning at 8:40 a.m.  She was now 4-5 cm dilated,   90% effaced and 0 station with light meconium.  Fetal status remained   reassuring.  The patient then precipitously changed to complete at 10:00 a.m.    She was allowed to labor down. When the patient had the urge to push, she   started pushing. At 11:47 a.m., she was prepped and draped in the usual   sterile fashion and then I assisted with a normal spontaneous vaginal delivery   of a vigorous male in JAMESON position.  The head was delivered atraumatically.    A loose nuchal cord x1 was reduced and the rest of the infant delivered   without any problems.  Mouth and nose were bulb suctioned.  RT was at the   bedside.  Infant was then placed on maternal abdomen.  Delayed cord  clamping   was performed and then the cord was doubly clamped and cut by the infant's   father.  Cord gases were clamped and set aside.  The placenta was then   delivered intact at 11:51 a.m.  Three-vessel cord was noted.  The uterus was   firm and hemostatic with an EBL of 300.  She did have a deep vaginal   laceration on the left side that was repaired with 2-0 Vicryl on a CT1 needle   without any problems.  She also had a left labial laceration that was repaired   with 2-0 Vicryl on a CT1 needle without any problems.  The patient tolerated   the procedure well.  Lap and needle counts were correct x2.  Baby and mom are   doing well.  The patient was GBS positive and had 3 doses of penicillin G,   prior to delivery.  This was again a vigorous male with Apgars 8 and 9, EBL   300 and infant's weight is pending.        ______________________________  MD MARCO A RENAE/JOS    DD:  09/07/2023 12:18  DT:  09/07/2023 20:12    Job#:  [id number]

## 2023-09-08 NOTE — PROGRESS NOTES
Patient stated that she is comfortable with latching of baby during breastfeeding and would like to be discharged today, 9/8. I called Dr. Leblanc to inform her of the patient's decision and she gave the ok to discharge patient.

## 2023-09-09 NOTE — PROGRESS NOTES
Discharge paperwork for infant and mom discussed at bedside. All questions answered. Follow-up appointments reviewed. Parents aware of NBS #2 and dates to complete it by. Paperwork signed and dated at this time.

## 2023-09-15 ENCOUNTER — OFFICE VISIT (OUTPATIENT)
Dept: OBGYN | Facility: CLINIC | Age: 28
End: 2023-09-15
Payer: OTHER GOVERNMENT

## 2023-09-15 DIAGNOSIS — O92.5 LACTATION SUPPRESSION: ICD-10-CM

## 2023-09-15 DIAGNOSIS — O92.29 SORE NIPPLES DUE TO LACTATION: ICD-10-CM

## 2023-09-15 PROCEDURE — G2212 PROLONG OUTPT/OFFICE VIS: HCPCS | Performed by: NURSE PRACTITIONER

## 2023-09-15 PROCEDURE — 99215 OFFICE O/P EST HI 40 MIN: CPT | Performed by: NURSE PRACTITIONER

## 2023-09-15 NOTE — PROGRESS NOTES
Summary: Exclusively breastfeeding both sides, often feeding 40-60 minutes per feeding. Infant at 9.7% loss at day 8. Pumped once for 1.5oz.  Baby Med falls asleep at the breast but mom keeps reminding him to eat. He is peeing and pooping, no jaundice. Megan nipples are sore, cracked and scabbed bilaterally.   Today: Assisted latch to the left, fine tuning hand position and latch and it felt better. Repeated on the right. 6ml each side, total 12ml. Pumped for 45ml, fed back 20ml easily, Med very content.   Plan: Maximize supply with pumping only for the next 24-36 hours, feed all milk back to him. Volumes provided in writing. Heal nipples with pumping, better positioning and comfort gels. Breastfeed as desired but after 48 hours re-introduce it if that is your goal, your nipples will feel much better and there will be more milk and he will be growing well.   Follow up:   Lactation appointment:  Early October  Baby 's Provider appointment: 2 Month Well Child Check   Referrals: None    Maternal Diagnosis/Problem:  Sore nipples due to lactation and Lactation Suppression   Infant Diagnosis/Problem:   difficulties feeding at the breast     Subjective:     Yamilex Horton is a 28 y.o. female here for lactation care. She is here today with baby.    Concerns:   Maternal: Latch on difficulties , Cracked/bleeding nipples , Nipple pain , Feeling that there is not enough milk , Weight check, Infant feeding evaluation, and Breastfeeding questions   Infant: Sleepy baby and Baby always seems hungry    HPI:   Pertinent  history:  38.4weeks    Mother does not have a history of advanced maternal age, GDM, hypertension prior to pregnancy, GHTN, insulin resistance, multiple gestation, PCOS, auto immune disease , placenta encapsulation, and breast surgery    Mother does have thyroid disease. Common condition(s) that may interfere in milk supply.    Breast changes in pregnancy: Yes  History of breast surgeries:  No    FEEDING HISTORY:    Previous Breastfeeding History: First baby.   Hospital Course: Some latching, not consistent  Currently 9/15/2023: Exclusively breastfeeding both sides, often feeding 40-60 minutes per feeding. Infant at 9.7% loss at day 8. Pumped once for 1.5oz.  Baby Med falls asleep at the breast but mom keeps reminding him to eat. He is peeing and pooping, no jaundice. Yamilex's nipples are sore, cracked and scabbed bilaterally.     Both breasts: Yes    Supplement: None     Nipple Shield Use: None    Breast Pumping:  Not Pumping   Type of Pump: Spectra 1  Flange size/type: 24mm  NO pain with pumping    Maternal ROS:  Constitutional: No fever, chills. Feeling well  Breasts: No soreness of breasts and Soreness of nipples  Psychiatric: Managing ok  Mental Health: No mention of feeling irritable, agitated, angry, overwhelmed, apathetic, appetite changes, exhausted nor having sleep changes outside infant feeds/demands.  Current Outpatient Medications on File Prior to Visit   Medication Sig Dispense Refill    Prenatal Vit-Fe Fumarate-FA (PRENATAL 1+1 PO) Take  by mouth.      levothyroxine (SYNTHROID) 50 MCG Tab Take 50 mcg by mouth every morning on an empty stomach.       No current facility-administered medications on file prior to visit.      No past medical history on file.        Objective:     Maternal Physical Lactation Exam  General: No acute distress  Breasts: Symmetrical , Soft, Plugged Duct - no evidence, and Mastitis  - no S/S  Nipples: abraded, cracked, scabbed/crusting, and across face of nipple bilaterally, all clean wounds  Psychiatric: Normal mood and affect. Her behavior is normal. Judgment and thought content normal.  Mental Health: Did NOT exhibit sadness, crying, feeling overwhelmed, agitation or hypervigilance.    Assessment/Plan & Lactation Counseling:     Infant Exam on Infant Chart    Infant Weight History:   9/7/23  7#4.2oz  9/11/23  6#8oz Ped office  9/15/23 6#8.9oz    Infant Intake  at Breast:   L   6ml     R   6,;    Total: 12ml  Milk Transfer at this feeding:   Ineffective breastfeeding; not able to transfer a full feed from breast r/t  Milk transfer impacted by    Weight loss  Flow    Pumped:   Type of Pump: Platinum   Quantity Pumped:   Total: 45ml  Initiation of Feeding: Infant initiates  Position of Feeding:    Right: football  Left: cross cradle  Attachment Achieved: rapidly  Nipple shield: N/A       Difficult Latch Due To:   Position and latch    Suck Pattern at the Breast: Suck burst and normal rest  Suck Pattern on the Bottle:   Suck burst and normal rest  Behavior Following Observed Feeding: sleeping  Nipple Pain From:Contact forces of the tongue causing nipple strain resulting in damage     Latch: Assisted latch  Suckling/Feeding: attaches, baby roots, elicits CESAR, frequent pauses, and rhythmic  Sucking strength: Moderate   Sucking Rhythm Coordinated   Compression: WNL    Once latched, baby did not fall into a consistent and mature and fully integrated SSB pattern.  Swallowing No difficulty noted  If functional feeding, it is quiet, rhythmic, coordinated, organized, effeicent safe, satisfying and pleasurable for both parent and baby? No  Milk Supply Available: Building, delayed    Low Milk Supply:   Likely due to: delay in lactogenesis II and ineffective or infrequent breast stimulation or milk removal    MATERNAL PERSONALIZED BREASTFEEDING PLAN  Discussed concerns and symptoms as listed above in assessment and guidance summarized below. Shared decision making was used between myself and the family for this encounter, home care, and follow up. Topics reviewed included:   4th Trimester: The 12-week period immediately after mom has had the baby. Not everyone has heard of it, but every mother and their  baby will go through it. It is a time of great physical and emotional change as the baby adjusts to being outside the womb, and the family adjusts to new life as parents  During  "the fourth trimester, one can expect fussiness and crying from the baby and very likely exhaustion for the family.  babies are learning to adjust to life outside the womb where it was warm and squishy!  There is a lot of misinformation about babies and their needs, and parents are often encouraged to ignore baby's signals. Bad idea. Babies are “half-baked” at birth and have much to learn with the help of physical and emotional support from caregivers. Taking care of a baby's needs is an investment that pays off with a happier, healthier child and adult.  It can take weeks or even months for your body to feel totally normal again. There is a major hormonal upheaval experienced by moms in the first few weeks after birth, because their bodies are shifting from many pregnancy hormones to a more normal hormonal make-up.  These first three months with baby earth side is a delicate time. Honor it with a mindful dose of support. Mindful Mamma's is an moises that may help.   Self-Compassion through Relational Support and Interaction.   Be kind to ourself. This is the first step in reducing anxiety and depression. Pay attention to how you are doing.   What do you need? Food, Rest, Sleep, Support, to Laugh/giggle: who will you ask today? They want to help you. We want to help you.   How do you stop your self-blame, or your self-criticism?   Get out of the house each day, walk or drive somewhere or ask a friend to drive you,  a \"treat\" at a drive through.   Mood and Anxiety Disorders: Having a new baby can be joyful time for some new moms, but a difficult time for others. For all, it is a time of profound physical and emotional change. Balancing baby, family, partners and friends, work, pets, and preexisting or new life stressors as well as sleep deprivation can be extremely challenging. Untreated depression, sleep exhaustion, and anxiety are each a challenge that must be addressed and in addition can " contribute to early cessation of breastfeeding. It is important both for the mental health and physical health of new moms and babies to get on the path to feeling better and if therapeutic support is needed, specific resources are available.   Triple Feeding and its sustainability and its impact on the mother baby relationship  Sleep or Lack of: Human Giver Syndrome (moms) tells us it’s “self-indulgent” to rest and sleep, which makes as much sense as believing it’s weak or self indulgent to breathe. We discussed strategies to manage restorative sleep, although short amounts, significant to the mental health of the mother. The principle follows:  Mom goes to sleep right after 8pm +/- feeding  Partner covers first late evening feeding (10pm/11pm), settles baby,  then goes to bed  Mom covers next feeding 1am /2am, partner sleeps  Next feeding share  The nature of infants oral head/neck structure and function and its impact on latch and transfer of milk.   Discussed Mechanical forces resulting in strain patterns during intrauterine life and during birth may negatively affect the oral-motor function of the  and compromise structure and function.  Joint restriction or hypo-mobility could be a logical progression following the relative lack of mobility during the last crowded months of gestation. An exceptionally flexed or rotated fetal posture may tighten myofascial structures in the neck, restricting the hyoid bone and strap muscles that support an effective swallow. More research reveals the body as an integrated whole via its major structure-maintaining and sensory organ, the fascia.  Other musculoskeletal issues, such as neck preferences, may also affect an infant's ability to nurse. These views have expanded and deepened over time.   Unilateral neck rotation is a normal  finding that should correct itself over the next few weeks.    However when there is a neck preference it can make latching more  difficult.    Infant head can be supported in the car seat.    Bottle feeding baby's can be encouraged to look to the opposite side of the preference  Infant can be can talked to from the side encouraging baby to turn to.   Milk Supply is dependent on glandular tissue development, hormonal influences, how many times the baby removes milk and how well the breasts are emptied in a 24 hour period. This is a biological reality that we can NOT work around. If, for any reason, your baby is not latching, or you are not able to nurse, then it is important for you to remove the milk instead by pumping or hand expression.  There's no magic trick, tea, food, drink, cookie or supplement that will increase your milk supply. One  must  effectively remove milk to continue to make and maximize milk. In the early days and weeks that can be 8+ times in 24 hours. For older babies, on average 6-7 + times in 24 hours.    Hydration: Staying hydrated is important however lack of hydration is usually not a cause of significantly low milk production.  Everyone needs a different amount of water, depending on their activity and diet. A high salt and/or high-protein diet, high physical activity, or very warm weather/sweating will require more fluids. A person eating a diet high in veggies and fruit, with a lack of physical activity will require fewer fluids. There is no magic number for the # of ounces of water each day.The best way to know that you are well hydrated is by looking at your urine.  Urine should look clear to light pale yellow, and you should need to pee at least every 3 to 4 hours unless you have a large bladder capacity.  Low Milk Supply: [place] births 3.6 million, 5-15% chronic insufficient production, 180,000- 500,000 individuals - not as rare as we think (Jun et al, 2021).   There's unfortunately countless other reasons why these moms have initial and potentially prolonged hypogalactia (low milk supply) (depending on their  health baseline, how quickly they got lactation intervention etc).  Most commonly, it's separation from baby/delayed initiation of breast stimulation and massive fluid overload (particularly with hemorrhage) that results in a lot of interstitial edema (particularly in the breast).  Your common factors:  Lack of nipple/breast stimulation (Baby at the breast but not suckling, mostly non nutritive sucking)  Lack of breast emptying (Baby at the breast but no removing much milk)  Herbs and Medications: A galactagogue is an herb or medication taken by a breastfeeding mother to increase her milk supply. We know that for centuries mothers around the world have sought out remedies to increase their milk supply. However, there is limited research on the safety and effectiveness of herbal galactagogues, which makes it hard for us to endorse them. It is not known if any of these herbs are truly effective, and it is difficult to predict how a specific herbal galactagogue will affect an individual, requiring “trial and error” in many situations. When effective, results are generally seen within 24-72 hours of starting an herbal galactagogue. Many of these herbs are used to decrease high blood sugar. If you are diabetic or have problems with low blood sugar, or thyroid disease you may not be a candidate for herbs. Not all women can increase their low milk supply with a galactagogue due to the many underlying causes of low milk production.  When taking a galactagogue, remember that frequent milk removal is still the most effective way to increase supply.   Feeding:   Infant difficulty with feeding, slow growth. Guidelines to follow:  Feed your baby every 1.5-3 hours, more often if baby acts hungry.   Awaken baby for feeding if going over 3 hours in the day.   Until back to birth weight, ONE four hour at night is acceptable if has had 8 prior feedings in 24 hours.    Daily goal: 8-12 feedings per 24 hours.   Strategies to facilitate  "feedings  Pump for 24-36 hours for nipple healing and ' measurement\"  Deep latch  Maximize supply  Formula if needed  Supplement:   Supplement with expressed milk  Supplement with formula if needed   Proper powder formula preparation: https://www.cdc.gov/nutrition/downloads/prepare-store-powered-lsmeul-yuunzuo-057.pdf.   For babies under 2 months: https://www.cdc.gov/cronobacter/infection-and-infants.html  Pacing the feeding:  A slow flow nipple helps, but how you feed the baby is more important.  How you are  positioning the bottle can compensate for a faster flow nipple.  When bottle-feeding, the baby should control how much is consumed at a feeding.  Holding the baby in an upright position with the bottle horizontal ensures that the baby gets milk only when sucking.  Here is a nice video demonstrating this concept of paced bottle feeding,  https://www.jobandtalent.com/watch?v=LvYGM9cKG1D Think baby led, not parent led.  Pacifier Use:  The American Academy of Pediatrics' Position Paper reports: Although we recommend a conservative approach regarding pacifier use, we do not endorse a complete ban on the use of pacifiers, nor do we support an approach that induces parental guilt concerning their choices about the use of pacifiers. Pacifier use and breastfeeding in term and  newborns- a systematic review and meta-analysis from the  J of Pediatrics Published online 2022. Has found that when pacifiers are used among individuals who have been counseled on the risks, do not interfere with breastfeeding exclusivity or duration. These are parental choices.   Positioning Techniques for Bare Breast  Pillow used: Marci Sprague Nu Ke  Suggested positions Cross cradle and Football  Fine tune position by making sure your fingers beneath the breast as well as your bra, are out of the way of your baby's chin.  Positioning: See " "http://globalhealthmedia.org/portfolio-items/positions-for-breastfeeding/?bmjzlvtqlGI=57790  Latch on Techniques for Bare Breast.   Aim is an asymmetric deep latch.  First make yourself comfortable. Sit with knees bent (unless lying down), feet on a stool if needed. You will support your baby, and pillows will be used to support YOU. You want your baby's belly facing your breast/body (belly to belly). If your baby is extremely fussy and crying, do skin-to-skin for a while until he or she calms down, then try (or try again).   Fine tune latch:  By holding your baby more securely at the breast, assisting your baby to stay attached by:  Support your baby with your hand behind the shoulder blades (NOT THE HEAD).  1. Align your baby's NOSE with your nipple  2. Your baby's chin should be the first part of his or her body to touch your breast  3. Tickle the baby's upper lip or nose with your nipple  4. When your baby's mouth is WIDE OPEN, swiftly bring your baby's mouth over your nipple/areola.  Steps 2 and 3 could be slightly reversed order or essentially happening at the same time.  Bringing your baby to your breast, not breast to the baby  Your baby's cheek to touch breast securely, nose tipped back  Hold your baby firmly in place so when your baby forgets to suck and picks it back up again your baby is in the correct spot. You will be extinguishing behavior and replacing it with a deeper latch to stimulate suck and provide satisfaction at the breast.  Your baby needs as much breast as deep in the mouth as possible to allow your nipples to heal and for you more importantly to maximize efficiency at the breast  If that doesn’t help, you should make an appointment with me to re-evaluate.   Latch is asymmetrical, leading with the chin, getting the baby below the breast, as if offering a large \"deli arya sandwich\".  Here is a video from [employer] on the asymmetric latch       https://www.Eduquia.com/watch?v=0I-MJn7Qy01  Triple " "Feeding:  Things can change on a daily basis in the first few weeks, and feeding plans may need to adapted to protect both mom’s mental health and infant caloric intake.  The alternative to triple feeding is what I call “divide and conquer” or 'double feeding\":  Mom focuses on pumping to stimulate her breasts and remind the breasts that even if the baby got sleepy and behind in calories, they do in fact need to produce milk.  This is one of the  times where a pump is useful and medically indicated (aside from NICU, or other separation of mom and infant).  Dad, partner, or other relative focuses on getting calories in the baby.    It is OK to give a bottle.  Nipple confusion” in the setting of low milk production is actually more of “flow preference.”  Babies will either fuss at a breast without enough milk, or fall asleep.  Have mom put baby to breast at times when her milk flow should be higher (in the early morning hours; even moms with plenty of milk will have less in the afternoon and evening).  This is simply to maintain baby’s familiarity with mom’s breast.  The baby can suck for comfort, but it is necessary to ensure the baby continues to take in adequate volume from the bottle.  Over time, mom can gradually notice the baby swallowing more and the breast and being more satisfied.  However, this does not happen immediately and some moms have to continue supplementing for the duration of breastfeeding.  Like all of parenting, breastfeeding is a MARATHON and NOT a sprint.  It is much better to give a baby a bottle to sustain early breastfeeding challenges than become utterly exhausted with triple feeding and then give up.  \"Double feeding\" as baby prepares for more robust feeding sessions  If triple feeing, FIRST decide what you can do at that feeding and if you decide to double feed -pump and bottle, that is sufficient.  If you are going to offer the breast at that feeding:  nurse first and limit time on the " breasts to 20+/- min total  finish with bottle  pump afterwards to finish emptying your breasts which will help increase milk supply  As baby becomes more effective, evidenced by not pumping much milk after a breastfeeding, we will create a plan to decrease pumping.  Use your own pumped milk, donor human milk, or formula.  Pumping Guidelines:  Both breasts 8 times in 24 hours  Sustaining a healthy baseline prolactin level is vital- this means feeding/pumping at least every 3 hours with no more than a 5 hour break at night.   Pumping is effective but can quickly exhaust and overwhelm a new mother  Suggested Pump Routine if needed: 1am, 5am, 8am, 10am, 1pm, 4pm  7pm, and 9:30pm  Wearable pumps are not recommended to establish breastfeeding or regular use unless hyperlactation.  If working, it is suggested you pump once with a good personal pump to assure full emptying.  Bra    Consider a hands free bra - [employer] is recommended.  Type of Pump:  Spectra  Spectra Settings  Press letdown button when first starting         Cycle: 70 / Vacuum: L1 - L 5 (To comfort)  Once milk lets down, press letdown button again  Cycle: 54 / Vacuum: L1 - L12 (To comfort)  Power Pumping Not indicated, never indicated  How long will vary woman to woman, typically 8-15 minutes, or 1-2 minutes after flow slows  Flange Fit: Freely moving nipple in the tunnel with some movement of the areola.  Storage (Acceptable guidelines for healthy term babies)  10 hours at room temp including your pieces, may rinse but not mandatory  8 days refrigerator, don't need  to refrigerate right away if using fresh pumped milk at the next feeding  Freezer 1 year  Deep freezer 2 years  American Academy or Pediatrics has said you may mix different temperature milks.   If baby drinks breast milk from a fresh or refrigerated bottle of breast milk,  you may return the unused portion to the refrigerator and use once at next feeding.   Increasing supply besides  Galactogogue's and Pumping:  Warmth  Relaxation   Physical, auditory narratives  Childbirth relaxation Techniques  Acupuncture and acupressure  [facility] [email or web address]   Eduardo Clark [email or web address]  Nipple care:    May apply breastmilk  Moist-oily ointment after feeding/pumping, ie Lanolin nipple butter, coconut or olive oil, if desired/needed 2-3 times/day until nipples are healed  You do not need to wash this off before pumping or feeding the baby  You may want to remove baby from breast when active swallowing stops to avoid prolonged nipple compression  Hydrogels recommended and applied  Medela Hydrogels, Lansinoh Soothies and Ameda comfort gels are all the same, different companies  Answers to FAQs: https://www.[email or web address]/category/breastfeeding  Alcohol & Breastfeeding: What's your time-to-zero?  Cough & Cold Medications while Breastfeeding  Vitamin D Supplementation and Breastfeeding  Antidepressant Use While Breastfeeding: What should I know?  Breastfeeding, Caffeine, and Energy Drinks  Recommended resources:  Physicians guide to breastfeeding, must up to date and accurate information available on breastfeeding. https://physicianguidetobreastfeeding.org/  Connect with other mothers:  Facebook:   [place] Breastfeeds: https://www.[employer].com/[place].breastfeeds/  Well-Nourished Babies (Private group for questions and support): https://www.facebook.com/groups/116796515162938/  Breastfeeding [place] including opportunity to weight your baby and do before and after weights WEIGHT CHECKS  Tuesdays 10am - 11am. Women's Health at [address] [place] [address], [facility] [address] [place] [address], 3rd floor conference room  Check your baby's weight, do a feeding and see how your baby is growing, visit with other mothers, plan on a walk or coffee date after group.  Please download Growth: Baby and Child for Apple or Child Growth Tracker for [employer] if you would like to  document and follow your baby's growth  curve.  Due to space limitations - limit strollers please (New c/section moms please use your stroller).  We would love to have dads stay, but moms won't breastfeed if there are men in the room, sorry.  The room is generally scheduled for another event following group.  Please take all diapers with you   ONLINE SUPPORT GROUPS  [employer] (PSI) support groups are conducted using a [id number] support model and are not intended for those experiencing a mental health crisis. Groups are 90 minutes (1.5 hours) in length. The first ~30 minutes is providing information, education, and establishing group guidelines. The next ~60 minutes is “talk time,” in which group members share and talk with each other. Group members must be present for the group guidelines before joining in the discussion or “talk time.”     In Conclusion:   Managing breastfeeding and milk supply is very dynamic,can be a complex and intimate journey, and is not one size fits all. When obstacles present themselves, it takes confidence, persistence and support. The rights of the child include optimal nutrition and mothers need help to make informed decisions. You  and your baby have been screened for biological, psychological, and social risk factors that might interfere with achieving your goals.  Support is critical. We want the family thriving not just tolerating life. You are now the focus of our Breastfeeding Medicine team; we are here to support your decisions and vision.     Follow up requires close monitoring in this time sensitive window of opportunity to establish milk supply and facilitate the learning of  breastfeeding.    Please call [phone] our voicemail line or the front office at [phone] to scheduled your next appointment.  Family is encouraged to e-mail or mychart us to update how the plan is working.    A total of 90 minutes, not including infant assessment time,  was spent preparing to see the patient, obtaining  and reviewing separately obtained history, performing a medically appropriate examination and evaluation, counseling and educating the family, referring and communicating with other health care professionals, documenting clinical information in the electronic health record, independently interpreting weighted feeds and infant growth results, communicating these results to the family and care coordination as detailed in the above note.       ERIC Antonio.

## 2024-10-04 ENCOUNTER — HOSPITAL ENCOUNTER (OUTPATIENT)
Dept: LAB | Facility: MEDICAL CENTER | Age: 29
End: 2024-10-04
Attending: OBSTETRICS & GYNECOLOGY
Payer: OTHER GOVERNMENT

## 2024-10-04 LAB
ABO GROUP BLD: NORMAL
BASOPHILS # BLD AUTO: 0.8 % (ref 0–1.8)
BASOPHILS # BLD: 0.1 K/UL (ref 0–0.12)
BLD GP AB SCN SERPL QL: NORMAL
EOSINOPHIL # BLD AUTO: 0.25 K/UL (ref 0–0.51)
EOSINOPHIL NFR BLD: 2 % (ref 0–6.9)
ERYTHROCYTE [DISTWIDTH] IN BLOOD BY AUTOMATED COUNT: 41.6 FL (ref 35.9–50)
HCT VFR BLD AUTO: 41.3 % (ref 37–47)
HGB BLD-MCNC: 14.4 G/DL (ref 12–16)
IMM GRANULOCYTES # BLD AUTO: 0.02 K/UL (ref 0–0.11)
IMM GRANULOCYTES NFR BLD AUTO: 0.2 % (ref 0–0.9)
LYMPHOCYTES # BLD AUTO: 3.1 K/UL (ref 1–4.8)
LYMPHOCYTES NFR BLD: 25.4 % (ref 22–41)
MCH RBC QN AUTO: 31.9 PG (ref 27–33)
MCHC RBC AUTO-ENTMCNC: 34.9 G/DL (ref 32.2–35.5)
MCV RBC AUTO: 91.6 FL (ref 81.4–97.8)
MONOCYTES # BLD AUTO: 0.77 K/UL (ref 0–0.85)
MONOCYTES NFR BLD AUTO: 6.3 % (ref 0–13.4)
NEUTROPHILS # BLD AUTO: 7.97 K/UL (ref 1.82–7.42)
NEUTROPHILS NFR BLD: 65.3 % (ref 44–72)
NRBC # BLD AUTO: 0 K/UL
NRBC BLD-RTO: 0 /100 WBC (ref 0–0.2)
PLATELET # BLD AUTO: 292 K/UL (ref 164–446)
PMV BLD AUTO: 10.9 FL (ref 9–12.9)
RBC # BLD AUTO: 4.51 M/UL (ref 4.2–5.4)
RH BLD: NORMAL
WBC # BLD AUTO: 12.2 K/UL (ref 4.8–10.8)

## 2024-10-04 PROCEDURE — 84443 ASSAY THYROID STIM HORMONE: CPT

## 2024-10-04 PROCEDURE — 87340 HEPATITIS B SURFACE AG IA: CPT

## 2024-10-04 PROCEDURE — 86901 BLOOD TYPING SEROLOGIC RH(D): CPT

## 2024-10-04 PROCEDURE — 87389 HIV-1 AG W/HIV-1&-2 AB AG IA: CPT

## 2024-10-04 PROCEDURE — 86900 BLOOD TYPING SEROLOGIC ABO: CPT

## 2024-10-04 PROCEDURE — 86850 RBC ANTIBODY SCREEN: CPT

## 2024-10-04 PROCEDURE — 85025 COMPLETE CBC W/AUTO DIFF WBC: CPT

## 2024-10-04 PROCEDURE — 86762 RUBELLA ANTIBODY: CPT

## 2024-10-04 PROCEDURE — 36415 COLL VENOUS BLD VENIPUNCTURE: CPT

## 2024-10-04 PROCEDURE — 86803 HEPATITIS C AB TEST: CPT

## 2024-10-04 PROCEDURE — 86780 TREPONEMA PALLIDUM: CPT

## 2024-10-05 LAB
HBV SURFACE AG SER QL: NORMAL
HCV AB SER QL: NORMAL
HIV 1+2 AB+HIV1 P24 AG SERPL QL IA: NORMAL
RUBV AB SER QL: 18.9 IU/ML
T PALLIDUM AB SER QL IA: NORMAL
TSH SERPL-ACNC: 3.11 UIU/ML (ref 0.35–5.5)

## 2024-10-27 SDOH — ECONOMIC STABILITY: INCOME INSECURITY: IN THE LAST 12 MONTHS, WAS THERE A TIME WHEN YOU WERE NOT ABLE TO PAY THE MORTGAGE OR RENT ON TIME?: NO

## 2024-10-27 SDOH — ECONOMIC STABILITY: FOOD INSECURITY: WITHIN THE PAST 12 MONTHS, YOU WORRIED THAT YOUR FOOD WOULD RUN OUT BEFORE YOU GOT MONEY TO BUY MORE.: NEVER TRUE

## 2024-10-27 SDOH — ECONOMIC STABILITY: TRANSPORTATION INSECURITY
IN THE PAST 12 MONTHS, HAS LACK OF TRANSPORTATION KEPT YOU FROM MEETINGS, WORK, OR FROM GETTING THINGS NEEDED FOR DAILY LIVING?: NO

## 2024-10-27 SDOH — HEALTH STABILITY: PHYSICAL HEALTH: ON AVERAGE, HOW MANY DAYS PER WEEK DO YOU ENGAGE IN MODERATE TO STRENUOUS EXERCISE (LIKE A BRISK WALK)?: 4 DAYS

## 2024-10-27 SDOH — ECONOMIC STABILITY: TRANSPORTATION INSECURITY
IN THE PAST 12 MONTHS, HAS LACK OF RELIABLE TRANSPORTATION KEPT YOU FROM MEDICAL APPOINTMENTS, MEETINGS, WORK OR FROM GETTING THINGS NEEDED FOR DAILY LIVING?: NO

## 2024-10-27 SDOH — HEALTH STABILITY: MENTAL HEALTH
STRESS IS WHEN SOMEONE FEELS TENSE, NERVOUS, ANXIOUS, OR CAN'T SLEEP AT NIGHT BECAUSE THEIR MIND IS TROUBLED. HOW STRESSED ARE YOU?: VERY MUCH

## 2024-10-27 SDOH — ECONOMIC STABILITY: TRANSPORTATION INSECURITY
IN THE PAST 12 MONTHS, HAS THE LACK OF TRANSPORTATION KEPT YOU FROM MEDICAL APPOINTMENTS OR FROM GETTING MEDICATIONS?: NO

## 2024-10-27 SDOH — ECONOMIC STABILITY: FOOD INSECURITY: WITHIN THE PAST 12 MONTHS, THE FOOD YOU BOUGHT JUST DIDN'T LAST AND YOU DIDN'T HAVE MONEY TO GET MORE.: NEVER TRUE

## 2024-10-27 SDOH — HEALTH STABILITY: PHYSICAL HEALTH: ON AVERAGE, HOW MANY MINUTES DO YOU ENGAGE IN EXERCISE AT THIS LEVEL?: 30 MIN

## 2024-10-27 SDOH — ECONOMIC STABILITY: INCOME INSECURITY: HOW HARD IS IT FOR YOU TO PAY FOR THE VERY BASICS LIKE FOOD, HOUSING, MEDICAL CARE, AND HEATING?: NOT VERY HARD

## 2024-10-27 SDOH — ECONOMIC STABILITY: HOUSING INSECURITY
IN THE LAST 12 MONTHS, WAS THERE A TIME WHEN YOU DID NOT HAVE A STEADY PLACE TO SLEEP OR SLEPT IN A SHELTER (INCLUDING NOW)?: NO

## 2024-10-27 ASSESSMENT — LIFESTYLE VARIABLES
HOW OFTEN DO YOU HAVE A DRINK CONTAINING ALCOHOL: NEVER
SKIP TO QUESTIONS 9-10: 1
HOW OFTEN DO YOU HAVE SIX OR MORE DRINKS ON ONE OCCASION: NEVER
AUDIT-C TOTAL SCORE: 0
HOW MANY STANDARD DRINKS CONTAINING ALCOHOL DO YOU HAVE ON A TYPICAL DAY: PATIENT DOES NOT DRINK

## 2024-10-27 ASSESSMENT — SOCIAL DETERMINANTS OF HEALTH (SDOH)
HOW OFTEN DO YOU GET TOGETHER WITH FRIENDS OR RELATIVES?: ONCE A WEEK
HOW OFTEN DO YOU ATTENT MEETINGS OF THE CLUB OR ORGANIZATION YOU BELONG TO?: NEVER
IN A TYPICAL WEEK, HOW MANY TIMES DO YOU TALK ON THE PHONE WITH FAMILY, FRIENDS, OR NEIGHBORS?: MORE THAN THREE TIMES A WEEK
HOW OFTEN DO YOU ATTENT MEETINGS OF THE CLUB OR ORGANIZATION YOU BELONG TO?: NEVER
IN THE PAST 12 MONTHS, HAS THE ELECTRIC, GAS, OIL, OR WATER COMPANY THREATENED TO SHUT OFF SERVICE IN YOUR HOME?: NO
HOW MANY DRINKS CONTAINING ALCOHOL DO YOU HAVE ON A TYPICAL DAY WHEN YOU ARE DRINKING: PATIENT DOES NOT DRINK
HOW HARD IS IT FOR YOU TO PAY FOR THE VERY BASICS LIKE FOOD, HOUSING, MEDICAL CARE, AND HEATING?: NOT VERY HARD
HOW OFTEN DO YOU HAVE A DRINK CONTAINING ALCOHOL: NEVER
IN A TYPICAL WEEK, HOW MANY TIMES DO YOU TALK ON THE PHONE WITH FAMILY, FRIENDS, OR NEIGHBORS?: MORE THAN THREE TIMES A WEEK
DO YOU BELONG TO ANY CLUBS OR ORGANIZATIONS SUCH AS CHURCH GROUPS UNIONS, FRATERNAL OR ATHLETIC GROUPS, OR SCHOOL GROUPS?: NO
HOW OFTEN DO YOU GET TOGETHER WITH FRIENDS OR RELATIVES?: ONCE A WEEK
HOW OFTEN DO YOU HAVE SIX OR MORE DRINKS ON ONE OCCASION: NEVER
HOW OFTEN DO YOU ATTEND CHURCH OR RELIGIOUS SERVICES?: MORE THAN 4 TIMES PER YEAR
HOW OFTEN DO YOU ATTEND CHURCH OR RELIGIOUS SERVICES?: MORE THAN 4 TIMES PER YEAR
DO YOU BELONG TO ANY CLUBS OR ORGANIZATIONS SUCH AS CHURCH GROUPS UNIONS, FRATERNAL OR ATHLETIC GROUPS, OR SCHOOL GROUPS?: NO
WITHIN THE PAST 12 MONTHS, YOU WORRIED THAT YOUR FOOD WOULD RUN OUT BEFORE YOU GOT THE MONEY TO BUY MORE: NEVER TRUE

## 2024-10-30 ENCOUNTER — APPOINTMENT (OUTPATIENT)
Dept: MEDICAL GROUP | Age: 29
End: 2024-10-30
Payer: OTHER GOVERNMENT

## 2024-12-02 ENCOUNTER — HOSPITAL ENCOUNTER (OUTPATIENT)
Dept: LAB | Facility: MEDICAL CENTER | Age: 29
End: 2024-12-02
Attending: OBSTETRICS & GYNECOLOGY
Payer: OTHER GOVERNMENT

## 2024-12-02 LAB — TSH SERPL-ACNC: 2.94 UIU/ML (ref 0.35–5.5)

## 2024-12-02 PROCEDURE — 84443 ASSAY THYROID STIM HORMONE: CPT

## 2024-12-02 PROCEDURE — 36415 COLL VENOUS BLD VENIPUNCTURE: CPT

## 2024-12-04 ENCOUNTER — HOSPITAL ENCOUNTER (OUTPATIENT)
Dept: LAB | Facility: MEDICAL CENTER | Age: 29
End: 2024-12-04
Attending: OBSTETRICS & GYNECOLOGY
Payer: OTHER GOVERNMENT

## 2024-12-04 PROCEDURE — 36415 COLL VENOUS BLD VENIPUNCTURE: CPT

## 2025-01-14 ENCOUNTER — HOSPITAL ENCOUNTER (OUTPATIENT)
Dept: LAB | Facility: MEDICAL CENTER | Age: 30
End: 2025-01-14
Attending: OBSTETRICS & GYNECOLOGY
Payer: OTHER GOVERNMENT

## 2025-01-14 LAB — TSH SERPL-ACNC: 3.77 UIU/ML (ref 0.35–5.5)

## 2025-01-14 PROCEDURE — 84443 ASSAY THYROID STIM HORMONE: CPT

## 2025-01-14 PROCEDURE — 36415 COLL VENOUS BLD VENIPUNCTURE: CPT

## 2025-01-14 PROCEDURE — 82105 ALPHA-FETOPROTEIN SERUM: CPT

## 2025-01-17 LAB
# FETUSES US: NORMAL
AFP MOM SERPL: 0.91
AFP SERPL-MCNC: 35 NG/ML
AGE - REPORTED: 30 YR
CURRENT SMOKER: NO
FAMILY MEMBER DISEASES HX: NO
GA METHOD: NORMAL
GA: NORMAL WK
IDDM PATIENT QL: NO
INTEGRATED SCN PATIENT-IMP: NORMAL
SPECIMEN DRAWN SERPL: NORMAL

## 2025-03-12 ENCOUNTER — HOSPITAL ENCOUNTER (OUTPATIENT)
Dept: LAB | Facility: MEDICAL CENTER | Age: 30
End: 2025-03-12
Attending: OBSTETRICS & GYNECOLOGY
Payer: OTHER GOVERNMENT

## 2025-03-12 LAB
BASOPHILS # BLD AUTO: 0.4 % (ref 0–1.8)
BASOPHILS # BLD: 0.03 K/UL (ref 0–0.12)
EOSINOPHIL # BLD AUTO: 0.14 K/UL (ref 0–0.51)
EOSINOPHIL NFR BLD: 1.7 % (ref 0–6.9)
ERYTHROCYTE [DISTWIDTH] IN BLOOD BY AUTOMATED COUNT: 42.8 FL (ref 35.9–50)
HCT VFR BLD AUTO: 41 % (ref 37–47)
HGB BLD-MCNC: 14.3 G/DL (ref 12–16)
IMM GRANULOCYTES # BLD AUTO: 0.08 K/UL (ref 0–0.11)
IMM GRANULOCYTES NFR BLD AUTO: 0.9 % (ref 0–0.9)
LYMPHOCYTES # BLD AUTO: 0.88 K/UL (ref 1–4.8)
LYMPHOCYTES NFR BLD: 10.4 % (ref 22–41)
MCH RBC QN AUTO: 31.7 PG (ref 27–33)
MCHC RBC AUTO-ENTMCNC: 34.9 G/DL (ref 32.2–35.5)
MCV RBC AUTO: 90.9 FL (ref 81.4–97.8)
MONOCYTES # BLD AUTO: 0.65 K/UL (ref 0–0.85)
MONOCYTES NFR BLD AUTO: 7.7 % (ref 0–13.4)
NEUTROPHILS # BLD AUTO: 6.67 K/UL (ref 1.82–7.42)
NEUTROPHILS NFR BLD: 78.9 % (ref 44–72)
NRBC # BLD AUTO: 0 K/UL
NRBC BLD-RTO: 0 /100 WBC (ref 0–0.2)
PLATELET # BLD AUTO: 226 K/UL (ref 164–446)
PMV BLD AUTO: 10.4 FL (ref 9–12.9)
RBC # BLD AUTO: 4.51 M/UL (ref 4.2–5.4)
T PALLIDUM AB SER QL IA: NORMAL
WBC # BLD AUTO: 8.5 K/UL (ref 4.8–10.8)

## 2025-03-12 PROCEDURE — 86780 TREPONEMA PALLIDUM: CPT

## 2025-03-12 PROCEDURE — 36415 COLL VENOUS BLD VENIPUNCTURE: CPT

## 2025-03-12 PROCEDURE — 85025 COMPLETE CBC W/AUTO DIFF WBC: CPT

## 2025-03-12 PROCEDURE — 82950 GLUCOSE TEST: CPT

## 2025-03-12 PROCEDURE — 84443 ASSAY THYROID STIM HORMONE: CPT

## 2025-03-13 LAB
GLUCOSE 1H P 50 G GLC PO SERPL-MCNC: 97 MG/DL (ref 70–139)
TSH SERPL-ACNC: 2.81 UIU/ML (ref 0.35–5.5)

## 2025-05-27 LAB — GP B STREP DNA SPEC QL NAA+PROBE: POSITIVE

## 2025-06-08 ENCOUNTER — HOSPITAL ENCOUNTER (EMERGENCY)
Facility: MEDICAL CENTER | Age: 30
End: 2025-06-08
Attending: OBSTETRICS & GYNECOLOGY | Admitting: OBSTETRICS & GYNECOLOGY
Payer: OTHER GOVERNMENT

## 2025-06-08 VITALS
TEMPERATURE: 97.2 F | SYSTOLIC BLOOD PRESSURE: 122 MMHG | DIASTOLIC BLOOD PRESSURE: 67 MMHG | BODY MASS INDEX: 26.52 KG/M2 | WEIGHT: 175 LBS | HEART RATE: 84 BPM | RESPIRATION RATE: 16 BRPM | OXYGEN SATURATION: 95 % | HEIGHT: 68 IN

## 2025-06-08 LAB — A1 MICROGLOB PLACENTAL VAG QL: NEGATIVE

## 2025-06-08 PROCEDURE — 84112 EVAL AMNIOTIC FLUID PROTEIN: CPT

## 2025-06-08 PROCEDURE — 99283 EMERGENCY DEPT VISIT LOW MDM: CPT

## 2025-06-08 PROCEDURE — 59025 FETAL NON-STRESS TEST: CPT

## 2025-06-08 NOTE — ED PROVIDER NOTES
DATE OF SERVICE:  2025     OB ED CONSULT     CHIEF COMPLAINT:  Vaginal wetness.     HISTORY OF PRESENT ILLNESS:  This 29-year-old lady is .  Her JHOANA is   2025, established by 6-week crown rump length measurements, which makes   her EGA 37 and 5/7 weeks.  She came because of slight vaginal wetness   sensation this morning.  She has had no large gushes.  She has no painful   contractions.  She reports excellent fetal movement.  The nurse did a sterile   speculum exam, which was negative for pooled fluid.  Vaginal secretions were   AmniSure negative.  The nurse's digital exam revealed 1 cm dilatation,   cephalic presentation.  Fetal monitoring is reassuring.  The patient is   afebrile and normotensive.  I do not think there is any evidence of ruptured   membranes or labor.  She is discharged home in satisfactory condition.  She is   to keep her appointment with Dr. Banerjee in three days.        ______________________________  MD LANDEN Roger/GINA    DD:  2025 10:05  DT:  2025 15:05    Job#:  [id number]    CC:FLIP BANERJEE MD

## 2025-06-08 NOTE — PROGRESS NOTES
0840 - Patient of Dr. Leblanc presents with c/o SROM   Quinones Gestation today at 38.3 weeks    Report received from Mina GILLIAM RN,   Patient denies contractions/cramping, denies bleeding. Denies change to vision/edema/HA, Reports FM. FM/TOCO use discussed and placed, POC discussed.     Patient reports she experienced vaginal wetess around 12 last night, she put a pad in place and woke to a dry pad. Reports intercourse yesterday evening.     RN completing the VE reports 1/50/-2 with noted fluid that resembled semen. Amnisure was collected and sent to the lab.     Dry erase board updated with RN contact information; reviewed.   Patient encouraged to call RN with all questions concerns needs prn. Water/ice available/encouraged at the BS    0930 - Physician is currently in another delivery, Non-emergent message left for physician to call for report. Labs pending.    0935 - Report to Dr. Tate regarding patient arrival/complaint/status - negative amnisure. Physician to see patient at the BS.     0940 - Dr. Tate at the BS discussing concerns and POC for discharge home. Order received for discharge. Patient denies quesitons/concerns regarding care since arrival or POC for discharge.     1000 - Patient discharged home with specific instruction to return to L&D/Physician ie.. Bleeding/ROM/decreased FM/labor/concerns for self or baby.

## 2025-06-27 ENCOUNTER — HOSPITAL ENCOUNTER (EMERGENCY)
Facility: MEDICAL CENTER | Age: 30
End: 2025-06-27
Attending: OBSTETRICS & GYNECOLOGY | Admitting: OBSTETRICS & GYNECOLOGY
Payer: OTHER GOVERNMENT

## 2025-06-27 ENCOUNTER — APPOINTMENT (OUTPATIENT)
Dept: RADIOLOGY | Facility: MEDICAL CENTER | Age: 30
End: 2025-06-27
Attending: OBSTETRICS & GYNECOLOGY
Payer: OTHER GOVERNMENT

## 2025-06-27 VITALS
OXYGEN SATURATION: 98 % | BODY MASS INDEX: 26.52 KG/M2 | HEART RATE: 82 BPM | SYSTOLIC BLOOD PRESSURE: 111 MMHG | RESPIRATION RATE: 16 BRPM | HEIGHT: 68 IN | WEIGHT: 175 LBS | TEMPERATURE: 97.1 F | DIASTOLIC BLOOD PRESSURE: 76 MMHG

## 2025-06-27 LAB
A1 MICROGLOB PLACENTAL VAG QL: NEGATIVE
CRYSTALS AMN MICRO: NORMAL

## 2025-06-27 PROCEDURE — 84112 EVAL AMNIOTIC FLUID PROTEIN: CPT

## 2025-06-27 PROCEDURE — 302449 STATCHG TRIAGE ONLY (STATISTIC)

## 2025-06-27 PROCEDURE — 76815 OB US LIMITED FETUS(S): CPT

## 2025-06-27 PROCEDURE — 59025 FETAL NON-STRESS TEST: CPT

## 2025-06-27 PROCEDURE — 89060 EXAM SYNOVIAL FLUID CRYSTALS: CPT

## 2025-06-27 ASSESSMENT — PAIN SCALES - GENERAL: PAINLEVEL: 0 - NO PAIN

## 2025-06-27 NOTE — PROGRESS NOTES
Pt presents with LOF since 7am. Pt is  with EDC  making her 40&3. Pt reports normal FM, denies VB. Report clear fluid noted from vagina and onto shorts.     1020: SSE done, no pooling of fluid noted but some wetness noted in vagina. Fern test collected. SVE /-2.     1120: Fern test negative. Dr Jacobs called with update. Order for amnisure collected and SACHIN.     1157: US at bedside.

## 2025-06-27 NOTE — PROGRESS NOTES
1255:  Discharge instructions given including term labor precautions, UC's, ROM, VB, abn FM, when to return to L&D, pelvic rest and modified bedrest. Questions answered at length. Pt and FOB verbalized understanding and agreement with POC and discharge. Discharge instructions signed.

## 2025-06-28 ENCOUNTER — HOSPITAL ENCOUNTER (INPATIENT)
Facility: MEDICAL CENTER | Age: 30
LOS: 2 days | End: 2025-06-30
Attending: OBSTETRICS & GYNECOLOGY | Admitting: OBSTETRICS & GYNECOLOGY
Payer: OTHER GOVERNMENT

## 2025-06-28 ENCOUNTER — ANESTHESIA (OUTPATIENT)
Dept: ANESTHESIOLOGY | Facility: MEDICAL CENTER | Age: 30
End: 2025-06-28
Payer: OTHER GOVERNMENT

## 2025-06-28 ENCOUNTER — ANESTHESIA EVENT (OUTPATIENT)
Dept: ANESTHESIOLOGY | Facility: MEDICAL CENTER | Age: 30
End: 2025-06-28
Payer: OTHER GOVERNMENT

## 2025-06-28 ENCOUNTER — APPOINTMENT (OUTPATIENT)
Dept: OBGYN | Facility: MEDICAL CENTER | Age: 30
End: 2025-06-28
Attending: OBSTETRICS & GYNECOLOGY
Payer: OTHER GOVERNMENT

## 2025-06-28 LAB
BASOPHILS # BLD AUTO: 0.4 % (ref 0–1.8)
BASOPHILS # BLD: 0.06 K/UL (ref 0–0.12)
EOSINOPHIL # BLD AUTO: 0.18 K/UL (ref 0–0.51)
EOSINOPHIL NFR BLD: 1.1 % (ref 0–6.9)
ERYTHROCYTE [DISTWIDTH] IN BLOOD BY AUTOMATED COUNT: 42.3 FL (ref 35.9–50)
HCT VFR BLD AUTO: 38.6 % (ref 37–47)
HGB BLD-MCNC: 13.7 G/DL (ref 12–16)
HOLDING TUBE BB 8507: NORMAL
IMM GRANULOCYTES # BLD AUTO: 0.14 K/UL (ref 0–0.11)
IMM GRANULOCYTES NFR BLD AUTO: 0.9 % (ref 0–0.9)
LYMPHOCYTES # BLD AUTO: 2.15 K/UL (ref 1–4.8)
LYMPHOCYTES NFR BLD: 13.2 % (ref 22–41)
MCH RBC QN AUTO: 31.9 PG (ref 27–33)
MCHC RBC AUTO-ENTMCNC: 35.5 G/DL (ref 32.2–35.5)
MCV RBC AUTO: 90 FL (ref 81.4–97.8)
MONOCYTES # BLD AUTO: 0.92 K/UL (ref 0–0.85)
MONOCYTES NFR BLD AUTO: 5.7 % (ref 0–13.4)
NEUTROPHILS # BLD AUTO: 12.81 K/UL (ref 1.82–7.42)
NEUTROPHILS NFR BLD: 78.7 % (ref 44–72)
NRBC # BLD AUTO: 0 K/UL
NRBC BLD-RTO: 0 /100 WBC (ref 0–0.2)
PLATELET # BLD AUTO: 219 K/UL (ref 164–446)
PMV BLD AUTO: 9.9 FL (ref 9–12.9)
RBC # BLD AUTO: 4.29 M/UL (ref 4.2–5.4)
T PALLIDUM AB SER QL IA: NORMAL
WBC # BLD AUTO: 16.3 K/UL (ref 4.8–10.8)

## 2025-06-28 PROCEDURE — 700101 HCHG RX REV CODE 250: Performed by: OBSTETRICS & GYNECOLOGY

## 2025-06-28 PROCEDURE — 3E033VJ INTRODUCTION OF OTHER HORMONE INTO PERIPHERAL VEIN, PERCUTANEOUS APPROACH: ICD-10-PCS | Performed by: OBSTETRICS & GYNECOLOGY

## 2025-06-28 PROCEDURE — 700111 HCHG RX REV CODE 636 W/ 250 OVERRIDE (IP): Performed by: OBSTETRICS & GYNECOLOGY

## 2025-06-28 PROCEDURE — 700101 HCHG RX REV CODE 250: Performed by: STUDENT IN AN ORGANIZED HEALTH CARE EDUCATION/TRAINING PROGRAM

## 2025-06-28 PROCEDURE — 304965 HCHG RECOVERY SERVICES

## 2025-06-28 PROCEDURE — 86780 TREPONEMA PALLIDUM: CPT

## 2025-06-28 PROCEDURE — 36415 COLL VENOUS BLD VENIPUNCTURE: CPT

## 2025-06-28 PROCEDURE — 0UQMXZZ REPAIR VULVA, EXTERNAL APPROACH: ICD-10-PCS | Performed by: OBSTETRICS & GYNECOLOGY

## 2025-06-28 PROCEDURE — 770002 HCHG ROOM/CARE - OB PRIVATE (112)

## 2025-06-28 PROCEDURE — 700105 HCHG RX REV CODE 258: Performed by: OBSTETRICS & GYNECOLOGY

## 2025-06-28 PROCEDURE — 10907ZC DRAINAGE OF AMNIOTIC FLUID, THERAPEUTIC FROM PRODUCTS OF CONCEPTION, VIA NATURAL OR ARTIFICIAL OPENING: ICD-10-PCS | Performed by: OBSTETRICS & GYNECOLOGY

## 2025-06-28 PROCEDURE — 85025 COMPLETE CBC W/AUTO DIFF WBC: CPT

## 2025-06-28 PROCEDURE — 700111 HCHG RX REV CODE 636 W/ 250 OVERRIDE (IP): Performed by: STUDENT IN AN ORGANIZED HEALTH CARE EDUCATION/TRAINING PROGRAM

## 2025-06-28 PROCEDURE — 59409 OBSTETRICAL CARE: CPT

## 2025-06-28 PROCEDURE — 303615 HCHG EPIDURAL/SPINAL ANESTHESIA FOR LABOR

## 2025-06-28 RX ORDER — BUPIVACAINE HYDROCHLORIDE 2.5 MG/ML
INJECTION, SOLUTION EPIDURAL; INFILTRATION; INTRACAUDAL; PERINEURAL
Status: COMPLETED | OUTPATIENT
Start: 2025-06-28 | End: 2025-06-28

## 2025-06-28 RX ORDER — LIDOCAINE HYDROCHLORIDE 10 MG/ML
20 INJECTION, SOLUTION INFILTRATION; PERINEURAL
Status: DISCONTINUED | OUTPATIENT
Start: 2025-06-28 | End: 2025-06-28 | Stop reason: HOSPADM

## 2025-06-28 RX ORDER — SIMETHICONE 125 MG
125 TABLET,CHEWABLE ORAL 4 TIMES DAILY PRN
Status: DISCONTINUED | OUTPATIENT
Start: 2025-06-28 | End: 2025-06-30 | Stop reason: HOSPADM

## 2025-06-28 RX ORDER — MISOPROSTOL 200 UG/1
1000 TABLET ORAL
Status: DISCONTINUED | OUTPATIENT
Start: 2025-06-28 | End: 2025-06-28 | Stop reason: HOSPADM

## 2025-06-28 RX ORDER — SODIUM CHLORIDE, SODIUM LACTATE, POTASSIUM CHLORIDE, CALCIUM CHLORIDE 600; 310; 30; 20 MG/100ML; MG/100ML; MG/100ML; MG/100ML
INJECTION, SOLUTION INTRAVENOUS CONTINUOUS
Status: DISCONTINUED | OUTPATIENT
Start: 2025-06-28 | End: 2025-06-29 | Stop reason: HOSPADM

## 2025-06-28 RX ORDER — IBUPROFEN 800 MG/1
800 TABLET, FILM COATED ORAL
Status: DISCONTINUED | OUTPATIENT
Start: 2025-06-28 | End: 2025-06-28 | Stop reason: HOSPADM

## 2025-06-28 RX ORDER — TERBUTALINE SULFATE 1 MG/ML
0.25 INJECTION SUBCUTANEOUS
Status: DISCONTINUED | OUTPATIENT
Start: 2025-06-28 | End: 2025-06-28 | Stop reason: HOSPADM

## 2025-06-28 RX ORDER — SODIUM CHLORIDE, SODIUM LACTATE, POTASSIUM CHLORIDE, CALCIUM CHLORIDE 600; 310; 30; 20 MG/100ML; MG/100ML; MG/100ML; MG/100ML
2000 INJECTION, SOLUTION INTRAVENOUS PRN
Status: DISCONTINUED | OUTPATIENT
Start: 2025-06-28 | End: 2025-06-30 | Stop reason: HOSPADM

## 2025-06-28 RX ORDER — IBUPROFEN 800 MG/1
800 TABLET, FILM COATED ORAL EVERY 8 HOURS PRN
Status: DISCONTINUED | OUTPATIENT
Start: 2025-06-28 | End: 2025-06-30 | Stop reason: HOSPADM

## 2025-06-28 RX ORDER — SODIUM CHLORIDE, SODIUM LACTATE, POTASSIUM CHLORIDE, AND CALCIUM CHLORIDE .6; .31; .03; .02 G/100ML; G/100ML; G/100ML; G/100ML
250 INJECTION, SOLUTION INTRAVENOUS PRN
Status: DISCONTINUED | OUTPATIENT
Start: 2025-06-28 | End: 2025-06-28

## 2025-06-28 RX ORDER — EPHEDRINE SULFATE 50 MG/ML
5 INJECTION, SOLUTION INTRAVENOUS
Status: DISCONTINUED | OUTPATIENT
Start: 2025-06-28 | End: 2025-06-28

## 2025-06-28 RX ORDER — BUPIVACAINE HYDROCHLORIDE 2.5 MG/ML
INJECTION, SOLUTION EPIDURAL; INFILTRATION; INTRACAUDAL; PERINEURAL
Status: COMPLETED
Start: 2025-06-28 | End: 2025-06-28

## 2025-06-28 RX ORDER — LIDOCAINE HYDROCHLORIDE AND EPINEPHRINE 15; 5 MG/ML; UG/ML
INJECTION, SOLUTION EPIDURAL
Status: COMPLETED | OUTPATIENT
Start: 2025-06-28 | End: 2025-06-28

## 2025-06-28 RX ORDER — ONDANSETRON 2 MG/ML
4 INJECTION INTRAMUSCULAR; INTRAVENOUS EVERY 6 HOURS PRN
Status: DISCONTINUED | OUTPATIENT
Start: 2025-06-28 | End: 2025-06-28 | Stop reason: HOSPADM

## 2025-06-28 RX ORDER — ONDANSETRON 4 MG/1
4 TABLET, ORALLY DISINTEGRATING ORAL EVERY 6 HOURS PRN
Status: DISCONTINUED | OUTPATIENT
Start: 2025-06-28 | End: 2025-06-28 | Stop reason: HOSPADM

## 2025-06-28 RX ORDER — ROPIVACAINE HYDROCHLORIDE 2 MG/ML
INJECTION, SOLUTION EPIDURAL; INFILTRATION; PERINEURAL CONTINUOUS
Status: DISCONTINUED | OUTPATIENT
Start: 2025-06-28 | End: 2025-06-28

## 2025-06-28 RX ORDER — SODIUM CHLORIDE, SODIUM LACTATE, POTASSIUM CHLORIDE, AND CALCIUM CHLORIDE .6; .31; .03; .02 G/100ML; G/100ML; G/100ML; G/100ML
1000 INJECTION, SOLUTION INTRAVENOUS
Status: DISCONTINUED | OUTPATIENT
Start: 2025-06-28 | End: 2025-06-28

## 2025-06-28 RX ORDER — ACETAMINOPHEN 500 MG
1000 TABLET ORAL
Status: DISCONTINUED | OUTPATIENT
Start: 2025-06-28 | End: 2025-06-28 | Stop reason: HOSPADM

## 2025-06-28 RX ORDER — ACETAMINOPHEN 500 MG
1000 TABLET ORAL EVERY 6 HOURS PRN
Status: DISCONTINUED | OUTPATIENT
Start: 2025-06-28 | End: 2025-06-30 | Stop reason: HOSPADM

## 2025-06-28 RX ORDER — DOCUSATE SODIUM 100 MG/1
100 CAPSULE, LIQUID FILLED ORAL 2 TIMES DAILY PRN
Status: DISCONTINUED | OUTPATIENT
Start: 2025-06-28 | End: 2025-06-30 | Stop reason: HOSPADM

## 2025-06-28 RX ORDER — OXYTOCIN 10 [USP'U]/ML
10 INJECTION, SOLUTION INTRAMUSCULAR; INTRAVENOUS
Status: DISCONTINUED | OUTPATIENT
Start: 2025-06-28 | End: 2025-06-28 | Stop reason: HOSPADM

## 2025-06-28 RX ORDER — VITAMIN A ACETATE, BETA CAROTENE, ASCORBIC ACID, CHOLECALCIFEROL, .ALPHA.-TOCOPHEROL ACETATE, DL-, THIAMINE MONONITRATE, RIBOFLAVIN, NIACINAMIDE, PYRIDOXINE HYDROCHLORIDE, FOLIC ACID, CYANOCOBALAMIN, CALCIUM CARBONATE, FERROUS FUMARATE, ZINC OXIDE, CUPRIC OXIDE 3080; 12; 120; 400; 1; 1.84; 3; 20; 22; 920; 25; 200; 27; 10; 2 [IU]/1; UG/1; MG/1; [IU]/1; MG/1; MG/1; MG/1; MG/1; MG/1; [IU]/1; MG/1; MG/1; MG/1; MG/1; MG/1
1 TABLET, FILM COATED ORAL
Status: DISCONTINUED | OUTPATIENT
Start: 2025-06-29 | End: 2025-06-30 | Stop reason: HOSPADM

## 2025-06-28 RX ORDER — OXYCODONE HYDROCHLORIDE 5 MG/1
5 TABLET ORAL EVERY 4 HOURS PRN
Status: DISCONTINUED | OUTPATIENT
Start: 2025-06-28 | End: 2025-06-30 | Stop reason: HOSPADM

## 2025-06-28 RX ORDER — MISOPROSTOL 200 UG/1
1000 TABLET ORAL
Status: DISCONTINUED | OUTPATIENT
Start: 2025-06-28 | End: 2025-06-28

## 2025-06-28 RX ADMIN — SODIUM CHLORIDE, POTASSIUM CHLORIDE, SODIUM LACTATE AND CALCIUM CHLORIDE: 600; 310; 30; 20 INJECTION, SOLUTION INTRAVENOUS at 10:17

## 2025-06-28 RX ADMIN — OXYTOCIN 2 MILLI-UNITS/MIN: 10 INJECTION, SOLUTION INTRAMUSCULAR; INTRAVENOUS at 10:23

## 2025-06-28 RX ADMIN — BUPIVACAINE HYDROCHLORIDE 2.5 ML: 2.5 INJECTION, SOLUTION EPIDURAL; INFILTRATION; INTRACAUDAL at 14:40

## 2025-06-28 RX ADMIN — ROPIVACAINE HYDROCHLORIDE: 2 INJECTION, SOLUTION EPIDURAL; INFILTRATION; PERINEURAL at 15:13

## 2025-06-28 RX ADMIN — OXYTOCIN 125 ML/HR: 10 INJECTION, SOLUTION INTRAMUSCULAR; INTRAVENOUS at 20:31

## 2025-06-28 RX ADMIN — LIDOCAINE HYDROCHLORIDE,EPINEPHRINE BITARTRATE 3 ML: 15; .005 INJECTION, SOLUTION EPIDURAL; INFILTRATION; INTRACAUDAL; PERINEURAL at 14:40

## 2025-06-28 RX ADMIN — SODIUM CHLORIDE 5 MILLION UNITS: 900 INJECTION INTRAVENOUS at 10:21

## 2025-06-28 RX ADMIN — OXYTOCIN 20 UNITS: 10 INJECTION, SOLUTION INTRAMUSCULAR; INTRAVENOUS at 18:20

## 2025-06-28 RX ADMIN — WATER 2.5 MILLION UNITS: 1 INJECTION INTRAMUSCULAR; INTRAVENOUS; SUBCUTANEOUS at 15:12

## 2025-06-28 ASSESSMENT — LIFESTYLE VARIABLES
ALCOHOL_USE: NO
DOES PATIENT WANT TO STOP DRINKING: NO
TOTAL SCORE: 0
AVERAGE NUMBER OF DAYS PER WEEK YOU HAVE A DRINK CONTAINING ALCOHOL: 0
TOTAL SCORE: 0
TOTAL SCORE: 0
ON A TYPICAL DAY WHEN YOU DRINK ALCOHOL HOW MANY DRINKS DO YOU HAVE: 0
EVER FELT BAD OR GUILTY ABOUT YOUR DRINKING: NO
HAVE PEOPLE ANNOYED YOU BY CRITICIZING YOUR DRINKING: NO
EVER HAD A DRINK FIRST THING IN THE MORNING TO STEADY YOUR NERVES TO GET RID OF A HANGOVER: NO
HAVE YOU EVER FELT YOU SHOULD CUT DOWN ON YOUR DRINKING: NO
CONSUMPTION TOTAL: NEGATIVE
HOW MANY TIMES IN THE PAST YEAR HAVE YOU HAD 5 OR MORE DRINKS IN A DAY: 0

## 2025-06-28 ASSESSMENT — SOCIAL DETERMINANTS OF HEALTH (SDOH)
WITHIN THE PAST 12 MONTHS, THE FOOD YOU BOUGHT JUST DIDN'T LAST AND YOU DIDN'T HAVE MONEY TO GET MORE: NEVER TRUE
WITHIN THE PAST 12 MONTHS, YOU WORRIED THAT YOUR FOOD WOULD RUN OUT BEFORE YOU GOT THE MONEY TO BUY MORE: NEVER TRUE
IN THE PAST 12 MONTHS, HAS THE ELECTRIC, GAS, OIL, OR WATER COMPANY THREATENED TO SHUT OFF SERVICE IN YOUR HOME?: NO

## 2025-06-28 ASSESSMENT — PAIN DESCRIPTION - PAIN TYPE
TYPE: ACUTE PAIN

## 2025-06-28 ASSESSMENT — PATIENT HEALTH QUESTIONNAIRE - PHQ9
2. FEELING DOWN, DEPRESSED, IRRITABLE, OR HOPELESS: NOT AT ALL
SUM OF ALL RESPONSES TO PHQ9 QUESTIONS 1 AND 2: 0
1. LITTLE INTEREST OR PLEASURE IN DOING THINGS: NOT AT ALL

## 2025-06-28 NOTE — ANESTHESIA PROCEDURE NOTES
Epidural Block    Date/Time: 6/28/2025 2:40 PM    Performed by: Isrrael Bills M.D.  Authorized by: Marilyn Riddle D.O.    Patient Location:  OB  Start Time:  6/28/2025 2:40 PM  Reason for Block: labor analgesia    patient identified, IV checked, site marked, risks and benefits discussed, surgical consent, monitors and equipment checked and pre-op evaluation    Patient Position:  Sitting  Prep: Betadine, patient draped and sterile technique    Monitoring:  Blood pressure, continuous pulse oximetry and heart rate  Approach:  Midline  Location:  L3-L4  Injection Technique:  SHEREEN saline  Skin infiltration:  Lidocaine  Strength:  1%  Dose:  3ml  Needle Type:  Tuohy  Needle Gauge:  17 G  Needle Length:  3.5 in  Loss of resistance::  5  Catheter Size:  19 G  Catheter at Skin Depth:  11  Test Dose Result:  Negative   First attempt patient had vagal episode without loss of consciousness prior to SHEREEN. Needle removed and waited until patient recovered before second attempt with no issues.

## 2025-06-28 NOTE — PROGRESS NOTES
1300: Report from Bettie LAGUERRE, care assumed.  1400: Pt requesting an epidural, anesthesiologist called to bedside.  1418: Dr. Bills at bedside for epidural placement.  1429: Pt had a vagal episode prior to epidural placement. BP 83/48, SpO2 95%. Pt repositioned on right side.  1439: Pt states feeling better. Epidural placed.  1447: Test dose negative.  1600: Pt states she still having sharp pain on her lower pelvic area. Dr Riddle notified.  1630: Manual epidural bolus given by Dr. Riddle.  1705: AROM with light meconium fluid by Dr. Jacobs. SVE 7/100/-1.  1810: SVE C/C/+2. Dr. Jacobs called to bedside for delivery.  :  of viable male infant by Dr. Jacobs. Nuchal x1. Apgars 8/9.  2240: Pt assisted up to the bathroom. Pt unable to void, antonino care done.  2300: Pt taken up to postpartum in stable condition holding infant and with FOB by side. Infant bands checked x2. Report to Ramya LAGUERRE, care relinquished.

## 2025-06-28 NOTE — PROGRESS NOTES
" EDC -  EGA - 40.4    Pt arrived to labor and delivery for scheduled IOL. Pt placed in room S220.  External monitors in place X2. Category I FHT at this time. VSS. Pt reports good FM. Pt states she has had some UCs this am, but they have spaced out. Reports increased discharge this morning. Denies LOF. SVE /-2. FOB \"Gildardo\" at bedside. POC discussed with pt and family members, all questions answered. IV started, labs sent. Admit profile complete. Dr Jacobs updated, orders received.   1300 - Report to CARLOTTA Driver  "

## 2025-06-28 NOTE — ANESTHESIA PREPROCEDURE EVALUATION
Date: 06/28/25  Procedure: Labor Epidural         Relevant Problems   No relevant active problems       Physical Exam    Airway   Mallampati: III  TM distance: >3 FB  Neck ROM: full       Cardiovascular - normal exam  Rhythm: regular  Rate: normal    (-) murmur     Dental - normal exam           Pulmonary - normal examBreath sounds clear to auscultation     Abdominal   Comments: gravid   Neurological - normal exam                   Anesthesia Plan    ASA 2       Plan - epidural   Neuraxial block will be labor analgesia                  Pertinent diagnostic labs and testing reviewed    Informed Consent:    Anesthetic plan and risks discussed with patient.

## 2025-06-28 NOTE — PROGRESS NOTES
"IUP at 40w4d.  GBS positive.  IOL for postdates.    S:  Doing well.  She is comfortable with the epidural.  She would like to undergo amniotomy when it is time (after second dose of antibiotics).    O:  /74   Pulse 74   Temp 36.4 °C (97.6 °F) (Temporal)   Resp 18   Ht 1.727 m (5' 8\")   Wt 79.4 kg (175 lb)   SpO2 97%     A, A, and O x 3 NAD    SVE: deferred.    TOCO:  every 2-5 minutes    EFM: category I tracing. Reactive and without decelerations.    A/P: IUP at 40w4d.  IOL for post dates.  GBS positive.     Continue IV antibiotics.  Continue IV pitocin.  AROM for labor augmentation.  Anticipate .  "

## 2025-06-28 NOTE — H&P
DATE OF ADMISSION:  2025     ADMISSION DIAGNOSES:  1.  Intrauterine pregnancy at 40 plus 4 weeks' gestation.  2.  GBS positive.  3.  Favorable cervix.  4.  Maternal anxiety.  5.  Desires induction of labor.     HISTORY OF PRESENT ILLNESS:  The patient is a 30-year-old  2, para   1-0-0-1 at 40 plus 4 weeks' gestation based upon her a 6 plus 3-week   ultrasound performed on 2024, who presents for induction of labor   secondary to maternal anxiety and post dates.  The patient did not feel   comfortable waiting until next week for an induction of labor as she is   anxious about stillbirth.  The patient had reactive NSTs at the office.  She   is a patient of Dr. Leblanc.  She was initially scheduled for induction of   labor on 2025.  The patient reports excellent fetal movement.  She   denies vaginal bleeding, vaginal discharge, leakage of fluid, and regular and   painful uterine cramping and contractions.     Prenatal care with Dr. Felisa Leblanc, first visit on 2024.  Total   Visits 15.  Total weight gain 36 pounds.  Third trimester blood pressure is   111 to 123/71 to 82.     PRENATAL LABS:  GBS positive on 2025.  GC and chlamydia negative and   negative.  Blood type O positive, antibody screen negative. NIPT testing is   low risk for trisomies 21, 18, 13, monosomy X and triploidy, male fetus.    MSAFP for open spina bifida risk is screened negative.  TSH and free T4 are   normal.  RPR is nonreactive.  1-hour GCT 97.  Rubella immune.  Hepatitis B   surface antigen nonreactive.  Hep C viral antibody nonreactive.  HIV   nonreactive.     OBSTETRIC ULTRASOUND:  1.  On 2024 at 6 +3 weeks' gestation, JHOANA 2025.  2.  On 2025 at 20 plus 2 weeks' gestation, JHOANA 06/15/2025.  Fetal heart   rate 145 beats per minute.  Posterior placenta, no previa.  Breech   presentation.  SACHIN 15.  Cervical length 5.  Male fetus.  Normal anatomy;   however, mild renal dilatation  bilaterally.  3.  On 2025 at 26 +5 weeks gestation, JHOANA 2025.  Estimated fetal   weight 1 pound 15 ounces.  871 g.  30th percentile.  Vertex presentation.    Posterior grade I placenta, no previa.  Fetal heart rate 151 beats per minute.    SACHIN 14.  4.  On 2025 at 33 plus 2 weeks gestation, 18th percentile for growth.  4   pounds 5 ounces.  Fetal heart rate 135 beats per minute.  Vertex   presentation.  Posterior placental previa.  SACHIN 13.5 cm.  Abdominal   circumference noted to be lagging with possible IUGR.  5.  On 2025, at [facility], 34 weeks 1 day, 5 pounds 1   ounce.  41st percentile.  SACHIN 18.8 cm.  Regular fetal heart rate 139 beats per   minute.  Posterior placenta, no previa.  Symmetric growth.  Normal SACHIN.     PAST OBSTETRIC HISTORY:  x1 at 38 weeks' gestation, 7-pound 12-ounce male   infant via .     PAST SURGICAL HISTORY:  MCL reconstruction in  on the left.     GYNECOLOGIC HISTORY:  She denies STDs, PID, abnormal Paps or HSV.     SOCIAL HISTORY:  She is .  She denies alcohol, tobacco, or drug use.     ISSUES WITH PREGNANCY:  1.  Acquired hypothyroidism on no medications.  2.  Anxiety.  3.  GBS positive.     ALLERGIES:  No known drug allergies.     MEDICATIONS:  Prenatal vitamins.     PHYSICAL EXAMINATION ON ADMISSION:  VITAL SIGNS:  Blood pressure 118/71, pulse 71, temperature 98.  Respiratory   rate 16.  GENERAL:  Alert, awake, and oriented x3, in no acute distress.  EXTREMITIES: No clubbing, cyanosis or edema.  PELVIC:  Abdomen is , vertex by Leopold's.  Estimated fetal weight 3200   g.  Virgin, she is reji every 2-5 minutes.  External fetal monitoring   category I tracing reactive without decelerations.     LABORATORY DATA:  Labs on admission: White count 16.3, hemoglobin 13.7,   hematocrit 38.6, and platelets 219.     ASSESSMENT AND PLAN:  A 30-year-old  at 40 plus 4 weeks' gestation   based upon a 6-week ultrasound who  presents for post dates induction of   labor, who is GBS positive with a favorable cervix, who has maternal anxiety   and desires elective induction.     PLAN:  The patient will be started on IV penicillin for GBS prophylaxis.  She   will be started on IV Pitocin for labor augmentation.  She may have an   epidural for labor analgesia.  We will perform amniotomy after her second dose   of antibiotics and anticipate a normal spontaneous vaginal delivery.        ______________________________  Jenny Cummings MD    HTA/TERRENCE    DD:  06/28/2025 12:21  DT:  06/28/2025 15:43    Job#:  [id number]    CC:FLIP BANERJEE MD

## 2025-06-28 NOTE — H&P
"ADMISSION H AND P (DICTATED)    ADMISSION DIAGNOSIS     IUP at 40w4d.  Post dates.  GBS positive.  Maternal anxiety - desired IOL.    /71   Pulse 71   Temp 36.7 °C (98 °F) (Temporal)   Resp 16   Ht 1.727 m (5' 8\")   Wt 79.4 kg (175 lb)     A, A, and O x 3 NAD    SVE: at 0900 2/50%/-2    TOCO: every 2-5 minutes    EFM: category I tracing.  Reactive and without decelerations.    Vertex    EFW - 3200 grams    Extremities: no c/c/e    Recent Labs     25  0900   WBC 16.3*   RBC 4.29   HEMOGLOBIN 13.7   HEMATOCRIT 38.6   MCV 90.0   MCH 31.9   RDW 42.3   PLATELETCT 219   MPV 9.9   NEUTSPOLYS 78.70*   LYMPHOCYTES 13.20*   MONOCYTES 5.70   EOSINOPHILS 1.10   BASOPHILS 0.40     A/P: IUP at 40w4d. Postdates IOL. GBS positive.     Start IV pitocin per protocol.  Start IV penicillin.  Ok for epidural.  Anticipate   AROM after 2nd dose of IV pitocin  "

## 2025-06-29 LAB
ERYTHROCYTE [DISTWIDTH] IN BLOOD BY AUTOMATED COUNT: 44.8 FL (ref 35.9–50)
HCT VFR BLD AUTO: 38.3 % (ref 37–47)
HGB BLD-MCNC: 13.1 G/DL (ref 12–16)
MCH RBC QN AUTO: 32.1 PG (ref 27–33)
MCHC RBC AUTO-ENTMCNC: 34.2 G/DL (ref 32.2–35.5)
MCV RBC AUTO: 93.9 FL (ref 81.4–97.8)
PLATELET # BLD AUTO: 212 K/UL (ref 164–446)
PMV BLD AUTO: 10 FL (ref 9–12.9)
RBC # BLD AUTO: 4.08 M/UL (ref 4.2–5.4)
WBC # BLD AUTO: 17.7 K/UL (ref 4.8–10.8)

## 2025-06-29 PROCEDURE — 700102 HCHG RX REV CODE 250 W/ 637 OVERRIDE(OP): Performed by: OBSTETRICS & GYNECOLOGY

## 2025-06-29 PROCEDURE — A9270 NON-COVERED ITEM OR SERVICE: HCPCS | Performed by: OBSTETRICS & GYNECOLOGY

## 2025-06-29 PROCEDURE — 85027 COMPLETE CBC AUTOMATED: CPT

## 2025-06-29 PROCEDURE — 36415 COLL VENOUS BLD VENIPUNCTURE: CPT

## 2025-06-29 PROCEDURE — 770002 HCHG ROOM/CARE - OB PRIVATE (112)

## 2025-06-29 RX ADMIN — PRENATAL WITH FERROUS FUM AND FOLIC ACID 1 TABLET: 3080; 920; 120; 400; 22; 1.84; 3; 20; 10; 1; 12; 200; 27; 25; 2 TABLET ORAL at 07:56

## 2025-06-29 ASSESSMENT — EDINBURGH POSTNATAL DEPRESSION SCALE (EPDS)
THINGS HAVE BEEN GETTING ON TOP OF ME: NO, I HAVE BEEN COPING AS WELL AS EVER
I HAVE BEEN ABLE TO LAUGH AND SEE THE FUNNY SIDE OF THINGS: AS MUCH AS I ALWAYS COULD
I HAVE BEEN SO UNHAPPY THAT I HAVE BEEN CRYING: NO, NEVER
I HAVE FELT SAD OR MISERABLE: NO, NOT AT ALL
I HAVE BEEN ANXIOUS OR WORRIED FOR NO GOOD REASON: NO, NOT AT ALL
I HAVE FELT SCARED OR PANICKY FOR NO GOOD REASON: NO, NOT AT ALL
I HAVE LOOKED FORWARD WITH ENJOYMENT TO THINGS: AS MUCH AS I EVER DID
I HAVE BEEN SO UNHAPPY THAT I HAVE HAD DIFFICULTY SLEEPING: NOT AT ALL
I HAVE BLAMED MYSELF UNNECESSARILY WHEN THINGS WENT WRONG: NO, NEVER
THE THOUGHT OF HARMING MYSELF HAS OCCURRED TO ME: NEVER

## 2025-06-29 ASSESSMENT — PAIN DESCRIPTION - PAIN TYPE: TYPE: ACUTE PAIN

## 2025-06-29 NOTE — CARE PLAN
The patient is Stable - Low risk of patient condition declining or worsening    Shift Goals  Clinical Goals: pain control; monitor lochia  Patient Goals: pain control  Family Goals: support    Progress made toward(s) clinical / shift goals:  Educated patient on pain rating scales, frequent pain assessments in place, medicating per MAR, non pharmaceutical pain relief such as cold packs and positioning in use.        Patient is not progressing towards the following goals:

## 2025-06-29 NOTE — ANESTHESIA TIME REPORT
Anesthesia Start and Stop Event Times       Date Time Event    6/28/2025 1415 Ready for Procedure     1415 Anesthesia Start     1819 Anesthesia Stop          Responsible Staff  06/28/25      Name Role Begin End    Isrrael Bills M.D. Anesth 1415 1502    Marilyn Riddle D.O. Anesth 1502 1819          Overtime Reason:  no overtime (within assigned shift)    Comments:

## 2025-06-29 NOTE — ANESTHESIA POSTPROCEDURE EVALUATION
Patient: Yamilex Horton    Procedure Summary       Date: 06/28/25 Room / Location:     Anesthesia Start: 1415 Anesthesia Stop: 1819    Procedure: Labor Epidural Diagnosis:     Scheduled Providers:  Responsible Provider: Marilyn Riddle D.O.    Anesthesia Type: epidural ASA Status: 2            Final Anesthesia Type: epidural  Last vitals  BP   Blood Pressure: 111/56    Temp   36.6 °C (97.8 °F)    Pulse   74   Resp   18    SpO2   97 %      Anesthesia Post Evaluation    Patient location during evaluation: PACU  Patient participation: complete - patient participated  Level of consciousness: awake and alert    Airway patency: patent  Anesthetic complications: no  Cardiovascular status: hemodynamically stable  Respiratory status: acceptable  Hydration status: euvolemic    PONV: none          No notable events documented.     Nurse Pain Score: 0 (NPRS)

## 2025-06-29 NOTE — LACTATION NOTE
This note was copied from a baby's chart.  Initial Consult:     History:   MOBYamilex, is a 29 y/o  s/p  at 40+4 wks on 2025 at 1819. Mec, tight nuchal cord.     Baby boy had BW 3570 g (7 lbs, 13.9 oz), AGA.     Family lives in [place], [place].      History of BF:  Multip.  21 month old for 2 weeks, difficult latch, nipple pain, infant wt loss, lack of support as FOB out of town during attempt at 3-step plan.      Report of Current Breastfeeding Status:  This baby fed well in the cunha hour, and has been feeding every few hours since. MOB hand expressed colostrum into his mouth when he was sleepy overnight. Beginning to clusterfeed this afternoon. L10 mom only. Sustained nutritive pattern with audible swallows. MOB denies breast/nipple discomfort.     Breastfeeding Assistance:      Provided breastfeeding education on: skin to skin, supply and demand, hunger cues, frequency/duration of breastfeeds, cluster feeding, shallow vs deep latch, and nutritive vs non-nutritive suck.    Reviewed signs of adequate milk intake, including adequate voids/stools, transition of stool to yellow/seedy around 5DOL, audible swallowing at breast, milk in mouth on release, progressive relaxation/satiation at breast during feeds, appropriate wt gain. Encouraged to keep peds appointments.     [facility] Breastfeeding Resources handout provided and outpatient lactation care and support [place] options reviewed. Highlighted IBCLC in [place]. MOB taking baby for PEDs care at [facility], so encouraged her to f/u soon after discharge with [place] [facility]CLC or Juma Cabello at [facility].     Encouraged to watch latch and sore nipples videos on [employer].     PLAN:    Skin to skin when either parent awake and alert.    Offer breast whenever hunger cues noted.    If baby sleeps more than 3 hours, wake baby and offer breast.    Watch latch and sore nipples videos on [employer].    If baby not waking for  feeding at least every 3 hours, hand express and spoon/cup feed back EBM to baby.

## 2025-06-29 NOTE — DISCHARGE SUMMARY
Discharge Summary:      Yamilex Horton    Admit Date:   2025  Discharge Date:  2025     Admitting diagnosis:  Indication for care in labor or delivery [O75.9]  Discharge Diagnosis: Status post  after IOL.  GBS positive.    ADDENDUM: patient stayed another day due to GBS positive status and living far from hospital.      History:  Past Medical History[1]  OB History    Para Term  AB Living   2 2 2   2   SAB IAB Ectopic Molar Multiple Live Births       0 2      # Outcome Date GA Lbr Gallito/2nd Weight Sex Type Anes PTL Lv   2 Term 25 40w4d / 00:09 3.57 kg (7 lb 13.9 oz) M Vag-Spont EPI N AGNIESZKA   1 Term 23 38w6d 01:22 / 01:47 3.295 kg (7 lb 4.2 oz) M Vag-Spont EPI N AGNIESZKA        Patient has no known allergies.  Patient Active Problem List    Diagnosis Date Noted    Labor and delivery indication for care or intervention 2023        Hospital Course:   30 y.o. , now para 2, was admitted with the above mentioned diagnosis, underwent  after IOL. Patient postpartum course was unremarkable, with progressive advancement in diet , ambulation and toleration of oral analgesia. Patient without complaints today and desires discharge.      Vitals:    25 2015 25 2030 25 2309 25 0600   BP: 107/59 111/56 95/65 105/60   Pulse: 69 74 68 70   Resp:   18 18   Temp:   36.7 °C (98.1 °F) 36.7 °C (98 °F)   TempSrc:   Temporal Temporal   SpO2:   92% 95%   Weight:       Height:           Current Facility-Administered Medications   Medication Dose    oxytocin (Pitocin) infusion (for post delivery)  125 mL/hr    lactated ringers infusion  2,000 mL    docusate sodium (Colace) capsule 100 mg  100 mg    ibuprofen (Motrin) tablet 800 mg  800 mg    acetaminophen (Tylenol) tablet 1,000 mg  1,000 mg    oxyCODONE immediate-release (Roxicodone) tablet 5 mg  5 mg    prenatal plus vitamin (Stuartnatal 1+1) 27-1 MG tablet 1 Tablet  1 Tablet    simethicone (Mylicon) chewable tablet  "125 mg  125 mg       Exam:    /60   Pulse 70   Temp 36.7 °C (98 °F) (Temporal)   Resp 18   Ht 1.727 m (5' 8\")   Wt 79.4 kg (175 lb)   SpO2 95%     A, A, and O x 3 NAD    FF u/1    NO c/c/e     Labs:  Recent Labs     06/28/25  0900   WBC 16.3*   RBC 4.29   HEMOGLOBIN 13.7   HEMATOCRIT 38.6   MCV 90.0   MCH 31.9   MCHC 35.5   RDW 42.3   PLATELETCT 219   MPV 9.9        Activity:   Discharge to home  Pelvic Rest x 6 weeks  Ambulate TID.  Continue prenatal vitamins while breast feeding.    Assessment:  Stable and ready for discharge.     Follow up: Dr. Leblanc for a postpartum appointment.     Discharge Meds:   No current outpatient medications on file.   Patient to take OTC ibuprofen, tylenol, and stool softeners.    Jenny Cummings M.D.               [1] No past medical history on file.    "

## 2025-06-29 NOTE — L&D DELIVERY NOTE
LABOR AND DELIVERY    DELIVERY SUMMARY    STAGE I LABOR:    The patient was admitted as a 29 yo  at 40w4d based upon a 6w3d u/s performed on 2024 and who presents for postdates induction of labor.  She is GBS positive.  She was started on IV pitocin per protocol.  At the time of admission, her cervix was noted to be 2/50%/-2.  She was started on IV penicillin for GBS prophylaxis.  She received an epidural for labor analgesia. She underwent AROM meconium stained fluid at 1705.  She progressed to completely dilated at 1810.  Category I-II tracing during stage I labor.    STAGE II LABOR: 9 MINUTES    I was present for a  of a viable male infant at 1819.  The vertex of the infant delivered without difficulty.  A tight nuchal cord was palpated and reduced at the perineum.   The infant's mouth and nose were bulb suctioned at the perineum.  The remainder of the infant delivered without difficulty and the infant was placed on the mother's abdomen. After two minutes of delayed cord clamping, the cord was clamped and cut.  A segment of cord was clamped and cut for a cord gas but these were held as the APGAR scores were 8 at one minute and 9 at five minutes.    The infant weight is pending.    The patient received IV pitocin after delivery of the infant.      STAGE III LABOR:  8 MINUTES    The placenta delivered intact with a 3-vessel cord at 1827.    The patient's perineum was examined and she sustained a first degree perineal/hymenal laceration and a first degree left labial laceration which were repaired in the usual fashion with 3-0 vicryl on a CT needle.    Bimanual uterine massage and exploration were performed and small clots were removed from the lower uterine segment.  The fundus was firm 1 cm below the umbilicus.    EBL - 200 cc

## 2025-06-29 NOTE — CARE PLAN
The patient is Stable - Low risk of patient condition declining or worsening    Shift Goals  Clinical Goals: Safe delivery  Patient Goals: Pain control, healthy mom and healthy baby  Family Goals: Support      Problem: Psychosocial - L&D  Goal: Spiritual and cultural needs incorporated into hospitalization  Outcome: Progressing  Flowsheets (Taken 6/28/2025 2141)  Incorporate Spiritual/Cultural Needs:   Encouraged verbilization of feelings, concerns, expectations and needs   Encouraged support system participation     Problem: Risk for Infection and Impaired Wound Healing  Goal: Patient will remain free from infection  Outcome: Progressing  Note: Pt is currently afebrile. Frequent temp checks in place since ROM     Problem: Pain  Goal: Patient's pain will be alleviated or reduced to the patient’s comfort goal  Outcome: Progressing  Note: Pt had a very good working epidural during labor. Pt educated on all pain management options available postpartum

## 2025-06-29 NOTE — CARE PLAN
Problem: Altered physiologic condition related to immediate post-delivery state and potential for bleeding/hemorrhage  Goal: Patient physiologically stable as evidenced by normal lochia, palpable uterine involution and vital signs within normal limits  Outcome: Progressing     Problem: Alteration in comfort related to episiotomy, vaginal repair and/or after birth pains  Goal: Patient verbalizes acceptable pain level  Outcome: Progressing   The patient is Stable - Low risk of patient condition declining or worsening    Shift Goals: pain controlled, rest  Clinical Goals: maintain normal lochia  Patient Goals: pain controlled, rest  Family Goals: support    Progress made toward(s) clinical / shift goals:  pt verbalized acceptable level of pain    Patient is not progressing towards the following goals:

## 2025-06-29 NOTE — PROGRESS NOTES
2300 Admitted from L and D via wheelchair, Pt oriented to the room, Safety precaution and plan of care discussed,  Pt unable to void in L and D encourage her to increase her fluid intake  to call if she needs help go the bathroom, Assessment done fundus firm with scant lochia, abdomen soft non distended, Denies pain at this time, Admission care rendered.

## 2025-06-30 VITALS
OXYGEN SATURATION: 96 % | TEMPERATURE: 97.5 F | RESPIRATION RATE: 15 BRPM | BODY MASS INDEX: 26.52 KG/M2 | WEIGHT: 175 LBS | SYSTOLIC BLOOD PRESSURE: 91 MMHG | HEART RATE: 57 BPM | HEIGHT: 68 IN | DIASTOLIC BLOOD PRESSURE: 52 MMHG

## 2025-06-30 PROCEDURE — 700102 HCHG RX REV CODE 250 W/ 637 OVERRIDE(OP): Performed by: OBSTETRICS & GYNECOLOGY

## 2025-06-30 PROCEDURE — A9270 NON-COVERED ITEM OR SERVICE: HCPCS | Performed by: OBSTETRICS & GYNECOLOGY

## 2025-06-30 RX ADMIN — PRENATAL WITH FERROUS FUM AND FOLIC ACID 1 TABLET: 3080; 920; 120; 400; 22; 1.84; 3; 20; 10; 1; 12; 200; 27; 25; 2 TABLET ORAL at 07:56

## 2025-06-30 ASSESSMENT — PAIN DESCRIPTION - PAIN TYPE: TYPE: ACUTE PAIN

## 2025-06-30 NOTE — PROGRESS NOTES
0700: Report received from yo LAGUERRE    0800: Assessment completed. Fundus firm, lochia scant. Plan of care reviewed. Denies pain at this time, will call if pain med intervention needed. Call light within reach, bed at lowest position, and shows no signs of distress at this time.     1155: Patient discharged home with family. All personal belongings collected. Discharge instructions discussed. Medications reviewed; Follow up appointments scheduled.

## 2025-06-30 NOTE — CARE PLAN
The patient is Stable - Low risk of patient condition declining or worsening    Shift Goals  Clinical Goals: Monitor VS/Bleeding  Patient Goals: Bond with baby  Family Goals: support    Progress made toward(s) clinical / shift goals:      Problem: Potential knowledge deficit related to lack of understanding of self and  care  Goal: Patient will verbalize understanding of self and infant care  Outcome: Progressing  Note: Patient updated on plan of care. All questions answered. Patient verbalized understanding. No further questions at this time.       Problem: Potential anxiety related to difficulty adapting to parental role  Goal: Patient will verbalize and demonstrate effective bonding and parenting behavior  Outcome: Progressing       Patient is not progressing towards the following goals:

## 2025-06-30 NOTE — LACTATION NOTE
Assisted with positioning and latch; Please see infant Feeding Assessment. Baby cluster fed throughout the night, discussed normal  feeding patterns.   Reviewed nipple care using mom's own milk.    Discussed avoiding artificial nipples for first 3-4 weeks.     Baby nursing well WNL. Current weight 3365 grams/ 7 lbs. 6.7 ounces., (-5.7%).

## 2025-06-30 NOTE — PROGRESS NOTES
2300 Pt doing well bonding with baby, Assessment done fundus firm with scant lochia, abdomen soft non distended, Denies pain at this time, Needs attended.

## 2025-06-30 NOTE — DISCHARGE INSTRUCTIONS
